# Patient Record
Sex: FEMALE | Race: WHITE | NOT HISPANIC OR LATINO | URBAN - METROPOLITAN AREA
[De-identification: names, ages, dates, MRNs, and addresses within clinical notes are randomized per-mention and may not be internally consistent; named-entity substitution may affect disease eponyms.]

---

## 2017-03-30 ENCOUNTER — EMERGENCY (EMERGENCY)
Facility: HOSPITAL | Age: 27
LOS: 1 days | Discharge: HOME | End: 2017-03-30
Attending: EMERGENCY MEDICINE | Admitting: EMERGENCY MEDICINE
Payer: COMMERCIAL

## 2017-03-30 VITALS
SYSTOLIC BLOOD PRESSURE: 154 MMHG | HEART RATE: 90 BPM | RESPIRATION RATE: 18 BRPM | OXYGEN SATURATION: 98 % | DIASTOLIC BLOOD PRESSURE: 84 MMHG

## 2017-03-30 VITALS
HEART RATE: 106 BPM | TEMPERATURE: 98 F | OXYGEN SATURATION: 98 % | SYSTOLIC BLOOD PRESSURE: 160 MMHG | RESPIRATION RATE: 16 BRPM | DIASTOLIC BLOOD PRESSURE: 90 MMHG

## 2017-03-30 DIAGNOSIS — Z90.49 ACQUIRED ABSENCE OF OTHER SPECIFIED PARTS OF DIGESTIVE TRACT: Chronic | ICD-10-CM

## 2017-03-30 DIAGNOSIS — G56.21 LESION OF ULNAR NERVE, RIGHT UPPER LIMB: ICD-10-CM

## 2017-03-30 DIAGNOSIS — R20.0 ANESTHESIA OF SKIN: ICD-10-CM

## 2017-03-30 LAB
ALBUMIN SERPL ELPH-MCNC: 3.7 G/DL — SIGNIFICANT CHANGE UP (ref 3.4–5)
ALP SERPL-CCNC: 65 U/L — SIGNIFICANT CHANGE UP (ref 40–120)
ALT FLD-CCNC: 18 U/L — SIGNIFICANT CHANGE UP (ref 12–42)
ANION GAP SERPL CALC-SCNC: 6 MMOL/L — LOW (ref 9–16)
AST SERPL-CCNC: 13 U/L — LOW (ref 15–37)
BILIRUB SERPL-MCNC: 0.7 MG/DL — SIGNIFICANT CHANGE UP (ref 0.2–1.2)
BUN SERPL-MCNC: 15 MG/DL — SIGNIFICANT CHANGE UP (ref 7–23)
CALCIUM SERPL-MCNC: 8.8 MG/DL — SIGNIFICANT CHANGE UP (ref 8.5–10.5)
CHLORIDE SERPL-SCNC: 106 MMOL/L — SIGNIFICANT CHANGE UP (ref 96–108)
CO2 SERPL-SCNC: 26 MMOL/L — SIGNIFICANT CHANGE UP (ref 22–31)
CREAT SERPL-MCNC: 0.71 MG/DL — SIGNIFICANT CHANGE UP (ref 0.5–1.3)
GLUCOSE SERPL-MCNC: 89 MG/DL — SIGNIFICANT CHANGE UP (ref 70–99)
HCT VFR BLD CALC: 40.9 % — SIGNIFICANT CHANGE UP (ref 34.5–45)
HGB BLD-MCNC: 14 G/DL — SIGNIFICANT CHANGE UP (ref 11.5–15.5)
MAGNESIUM SERPL-MCNC: 2 MG/DL — SIGNIFICANT CHANGE UP (ref 1.6–2.4)
MCHC RBC-ENTMCNC: 30.4 PG — SIGNIFICANT CHANGE UP (ref 27–34)
MCHC RBC-ENTMCNC: 34.2 G/DL — SIGNIFICANT CHANGE UP (ref 32–36)
MCV RBC AUTO: 88.7 FL — SIGNIFICANT CHANGE UP (ref 80–100)
PLATELET # BLD AUTO: 227 K/UL — SIGNIFICANT CHANGE UP (ref 150–400)
POTASSIUM SERPL-MCNC: 4.3 MMOL/L — SIGNIFICANT CHANGE UP (ref 3.5–5.3)
POTASSIUM SERPL-SCNC: 4.3 MMOL/L — SIGNIFICANT CHANGE UP (ref 3.5–5.3)
PROT SERPL-MCNC: 7.7 G/DL — SIGNIFICANT CHANGE UP (ref 6.4–8.2)
RBC # BLD: 4.61 M/UL — SIGNIFICANT CHANGE UP (ref 3.8–5.2)
RBC # FLD: 12.5 % — SIGNIFICANT CHANGE UP (ref 10.3–16.9)
SODIUM SERPL-SCNC: 138 MMOL/L — SIGNIFICANT CHANGE UP (ref 135–145)
WBC # BLD: 6.2 K/UL — SIGNIFICANT CHANGE UP (ref 3.8–10.5)
WBC # FLD AUTO: 6.2 K/UL — SIGNIFICANT CHANGE UP (ref 3.8–10.5)

## 2017-03-30 PROCEDURE — 36415 COLL VENOUS BLD VENIPUNCTURE: CPT

## 2017-03-30 PROCEDURE — 83735 ASSAY OF MAGNESIUM: CPT

## 2017-03-30 PROCEDURE — 85027 COMPLETE CBC AUTOMATED: CPT

## 2017-03-30 PROCEDURE — 80053 COMPREHEN METABOLIC PANEL: CPT

## 2017-03-30 PROCEDURE — 70551 MRI BRAIN STEM W/O DYE: CPT

## 2017-03-30 PROCEDURE — 70551 MRI BRAIN STEM W/O DYE: CPT | Mod: 26

## 2017-03-30 PROCEDURE — 72141 MRI NECK SPINE W/O DYE: CPT | Mod: 26

## 2017-03-30 PROCEDURE — 84443 ASSAY THYROID STIM HORMONE: CPT

## 2017-03-30 PROCEDURE — 99285 EMERGENCY DEPT VISIT HI MDM: CPT

## 2017-03-30 PROCEDURE — 72141 MRI NECK SPINE W/O DYE: CPT

## 2017-03-30 PROCEDURE — 99284 EMERGENCY DEPT VISIT MOD MDM: CPT | Mod: 25

## 2017-03-30 NOTE — ED ADULT TRIAGE NOTE - CHIEF COMPLAINT QUOTE
C/o of numbness on R arm since last night and R leg since this morning.  No unilateral weakness.  Strength appears equal in both arms.  No facial droop, no slurred speech.  Ambulatory w steady gait.

## 2017-03-30 NOTE — ED PROVIDER NOTE - MEDICAL DECISION MAKING DETAILS
25 y/o female w/ 5 days of R arm weakness and decreased sensation over ulnar aspect of R arm progressively worsening. Decreased sharp/dull distinction over dorsal ulnar aspect w/ 5-/5 strength in R handgrip/biceps/triceps compared w/ Left. No cervical spinal point tenderness. Ordered MRI c-spine and MRI brain to r/o pathology including MS or nerve impingement. 25 y/o female w/ 5 days of R arm weakness and decreased sensation over ulnar aspect of R arm progressively worsening. Decreased sharp/dull distinction over dorsal ulnar aspect w/ 5-/5 strength in R handgrip/biceps/triceps compared w/ Left. No cervical spinal point tenderness. Ordered MRI c-spine and MRI brain to r/o pathology including MS or nerve impingement -- MRIs negative. Possibly ulnar nerve impingement at elbow. Given hand surgery outpt f/u referral.

## 2017-03-30 NOTE — ED PROVIDER NOTE - MUSCULOSKELETAL, MLM
Strength in RUE 5-/5 handgrip, shoulder shrug, biceps/triceps; 5/5 in LUE. Holding arm flexed at elbow into chest. Pins and needles in RUE exacerbated by shoulder raise > 30 degrees

## 2017-03-30 NOTE — ED PROVIDER NOTE - CRANIAL NERVE AND PUPILLARY EXAM
tongue is midline/cranial nerves 2-12 intact/extra-ocular movements intact/gag reflex intact/central and peripheral vision intact

## 2017-03-30 NOTE — ED PROVIDER NOTE - ATTENDING CONTRIBUTION TO CARE
27yo F no PMH here with complaint of 4-5 days of R sided neck pain shooting down R arm. Has had similar pain in past, but this is new, and never felt numbness. Also feeling subjective weakness, works in OR and having trouble holding things. Seems to be in ulnar distribution.  on exam w/ decreased sensation in ulnar distribution from elbow to pinky.  R arm 4/5 compared to L on hand . Reflexes intact. Will plan to check MRI brain and c spine to r/o spinal pathology and neurological pathology as cause, as no clear peripheral reason for these symptoms, no recent trauma.

## 2017-03-30 NOTE — ED PROVIDER NOTE - OBJECTIVE STATEMENT
25 y/o female w/ no significant medical history presenting w/ Right sided neck "soreness' and progressive sensory and motor deficit in RUE. No trauma/injury. 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 Also w/ pins + needle and decreased sensation over Right arm from elbow to wrist predominantly on ulnar side and 3rd-5th finger. C/o neck soreness though no pain w/ lateral/forward flexion of neck. Symptoms improved if arm held at chest and exacerbated by raising of shoulder. No personal history of similar sxs. No family h/o neuro dx. 25 y/o female w/ no significant medical history presenting w/ Right sided neck "soreness' and progressive sensory and motor deficit in RUE. No trauma/injury. 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 Also w/ pins + needle and decreased sensation over Right arm from elbow to wrist predominantly on ulnar side and 3rd-5th finger. C/o neck soreness though no pain w/ lateral/forward flexion of neck. Symptoms improved if arm held at chest and exacerbated by raising of shoulder. C/o "muscle cramping" in R thigh today that self resolved, no other neuro sxs. No personal history of similar sxs. No family h/o neuro dx.

## 2017-03-30 NOTE — ED ADULT NURSE NOTE - OBJECTIVE STATEMENT
Patient c/o back pain. Gait steady. Skin intact. Will continue to monitor patient. Patient c/o numbness since last night. Gait steady, moving all extremities, sensation intact, no facial droop. Will continue to monitor patient.

## 2017-03-30 NOTE — ED PROVIDER NOTE - FAMILY HISTORY
Aunt  Still living? Unknown  Family history of thyroid disease, Age at diagnosis: Age Unknown     Mother  Still living? Unknown  Family history of diabetes mellitus, Age at diagnosis: Age Unknown

## 2018-08-10 ENCOUNTER — EMERGENCY (EMERGENCY)
Facility: HOSPITAL | Age: 28
LOS: 1 days | Discharge: HOME | End: 2018-08-10
Attending: EMERGENCY MEDICINE | Admitting: EMERGENCY MEDICINE
Payer: COMMERCIAL

## 2018-08-10 VITALS
RESPIRATION RATE: 18 BRPM | DIASTOLIC BLOOD PRESSURE: 74 MMHG | TEMPERATURE: 99 F | SYSTOLIC BLOOD PRESSURE: 121 MMHG | OXYGEN SATURATION: 100 % | HEART RATE: 85 BPM

## 2018-08-10 VITALS
RESPIRATION RATE: 20 BRPM | DIASTOLIC BLOOD PRESSURE: 71 MMHG | HEART RATE: 103 BPM | OXYGEN SATURATION: 97 % | TEMPERATURE: 102 F | SYSTOLIC BLOOD PRESSURE: 107 MMHG | WEIGHT: 193.79 LBS

## 2018-08-10 DIAGNOSIS — J02.9 ACUTE PHARYNGITIS, UNSPECIFIED: ICD-10-CM

## 2018-08-10 DIAGNOSIS — B34.1 ENTEROVIRUS INFECTION, UNSPECIFIED: ICD-10-CM

## 2018-08-10 DIAGNOSIS — R50.9 FEVER, UNSPECIFIED: ICD-10-CM

## 2018-08-10 DIAGNOSIS — Z90.49 ACQUIRED ABSENCE OF OTHER SPECIFIED PARTS OF DIGESTIVE TRACT: Chronic | ICD-10-CM

## 2018-08-10 LAB
ALBUMIN SERPL ELPH-MCNC: 4.3 G/DL — SIGNIFICANT CHANGE UP (ref 3.3–5)
ALP SERPL-CCNC: 53 U/L — SIGNIFICANT CHANGE UP (ref 40–120)
ALT FLD-CCNC: 20 U/L — SIGNIFICANT CHANGE UP (ref 10–45)
ANION GAP SERPL CALC-SCNC: 14 MMOL/L — SIGNIFICANT CHANGE UP (ref 5–17)
AST SERPL-CCNC: 23 U/L — SIGNIFICANT CHANGE UP (ref 10–40)
BILIRUB SERPL-MCNC: 0.6 MG/DL — SIGNIFICANT CHANGE UP (ref 0.2–1.2)
BUN SERPL-MCNC: 10 MG/DL — SIGNIFICANT CHANGE UP (ref 7–23)
CALCIUM SERPL-MCNC: 8.9 MG/DL — SIGNIFICANT CHANGE UP (ref 8.4–10.5)
CHLORIDE SERPL-SCNC: 101 MMOL/L — SIGNIFICANT CHANGE UP (ref 96–108)
CO2 SERPL-SCNC: 27 MMOL/L — SIGNIFICANT CHANGE UP (ref 22–31)
CREAT SERPL-MCNC: 0.73 MG/DL — SIGNIFICANT CHANGE UP (ref 0.5–1.3)
GLUCOSE SERPL-MCNC: 109 MG/DL — HIGH (ref 70–99)
HCT VFR BLD CALC: 39.8 % — SIGNIFICANT CHANGE UP (ref 34.5–45)
HGB BLD-MCNC: 12.9 G/DL — SIGNIFICANT CHANGE UP (ref 11.5–15.5)
MCHC RBC-ENTMCNC: 30.1 PG — SIGNIFICANT CHANGE UP (ref 27–34)
MCHC RBC-ENTMCNC: 32.4 G/DL — SIGNIFICANT CHANGE UP (ref 32–36)
MCV RBC AUTO: 93 FL — SIGNIFICANT CHANGE UP (ref 80–100)
PLATELET # BLD AUTO: 206 K/UL — SIGNIFICANT CHANGE UP (ref 150–400)
POTASSIUM SERPL-MCNC: 3.9 MMOL/L — SIGNIFICANT CHANGE UP (ref 3.5–5.3)
POTASSIUM SERPL-SCNC: 3.9 MMOL/L — SIGNIFICANT CHANGE UP (ref 3.5–5.3)
PROT SERPL-MCNC: 7.6 G/DL — SIGNIFICANT CHANGE UP (ref 6–8.3)
RAPID RVP RESULT: DETECTED
RBC # BLD: 4.28 M/UL — SIGNIFICANT CHANGE UP (ref 3.8–5.2)
RBC # FLD: 12.9 % — SIGNIFICANT CHANGE UP (ref 10.3–16.9)
RV+EV RNA SPEC QL NAA+PROBE: DETECTED
SODIUM SERPL-SCNC: 142 MMOL/L — SIGNIFICANT CHANGE UP (ref 135–145)
WBC # BLD: 8.4 K/UL — SIGNIFICANT CHANGE UP (ref 3.8–10.5)
WBC # FLD AUTO: 8.4 K/UL — SIGNIFICANT CHANGE UP (ref 3.8–10.5)

## 2018-08-10 PROCEDURE — 80053 COMPREHEN METABOLIC PANEL: CPT

## 2018-08-10 PROCEDURE — 36415 COLL VENOUS BLD VENIPUNCTURE: CPT

## 2018-08-10 PROCEDURE — 87798 DETECT AGENT NOS DNA AMP: CPT

## 2018-08-10 PROCEDURE — 87486 CHLMYD PNEUM DNA AMP PROBE: CPT

## 2018-08-10 PROCEDURE — 99284 EMERGENCY DEPT VISIT MOD MDM: CPT

## 2018-08-10 PROCEDURE — 99283 EMERGENCY DEPT VISIT LOW MDM: CPT | Mod: 25

## 2018-08-10 PROCEDURE — 71046 X-RAY EXAM CHEST 2 VIEWS: CPT | Mod: 26

## 2018-08-10 PROCEDURE — 85027 COMPLETE CBC AUTOMATED: CPT

## 2018-08-10 PROCEDURE — 87633 RESP VIRUS 12-25 TARGETS: CPT

## 2018-08-10 PROCEDURE — 71046 X-RAY EXAM CHEST 2 VIEWS: CPT

## 2018-08-10 PROCEDURE — 87581 M.PNEUMON DNA AMP PROBE: CPT

## 2018-08-10 RX ORDER — SODIUM CHLORIDE 9 MG/ML
1000 INJECTION INTRAMUSCULAR; INTRAVENOUS; SUBCUTANEOUS ONCE
Qty: 0 | Refills: 0 | Status: COMPLETED | OUTPATIENT
Start: 2018-08-10 | End: 2018-08-10

## 2018-08-10 RX ORDER — ACETAMINOPHEN 500 MG
650 TABLET ORAL ONCE
Qty: 0 | Refills: 0 | Status: COMPLETED | OUTPATIENT
Start: 2018-08-10 | End: 2018-08-10

## 2018-08-10 RX ADMIN — SODIUM CHLORIDE 2000 MILLILITER(S): 9 INJECTION INTRAMUSCULAR; INTRAVENOUS; SUBCUTANEOUS at 21:13

## 2018-08-10 RX ADMIN — Medication 650 MILLIGRAM(S): at 20:42

## 2018-08-10 NOTE — ED PROVIDER NOTE - MEDICAL DECISION MAKING DETAILS
27 y/o F presenting with flu like symptoms. Fever T 101.3 F, tachycardic. CXR wnl. Gave Tylenol. 1L NS. 27 y/o F presenting with flu like symptoms. Fever T 101.3 F, tachycardic. CXR wnl. Gave Tylenol. 1L NS. RVP positive for enterovirus/rhinovirus. Will discharge home with instructions to take tylenol as needed for fever and to rest.

## 2018-08-10 NOTE — ED PROVIDER NOTE - PHYSICAL EXAMINATION
PHYSICAL EXAM:    Constitutional: WDWN female resting comfortably in bed; NAD  Head: NC/AT  Eyes: PERRL, EOMI, anicteric sclera  ENT: no nasal discharge; uvula midline, mild oropharyngeal erythema - no exudates. MMM  Neck: supple; no JVD or thyromegaly  Respiratory: CTA B/L; no W/R/R, no retractions  Cardiac: +S1/S2; tachycardic; no M/R/G; PMI non-displaced  Gastrointestinal: abdomen soft, NT/ND; no rebound or guarding; +BSx4  Back: spine midline, no bony tenderness or step-offs; no CVAT B/L  Extremities: WWP, no clubbing or cyanosis; no peripheral edema  Musculoskeletal: NROM x4; no joint swelling, tenderness or erythema  Vascular: 2+ radial, femoral, DP/PT pulses B/L  Dermatologic: skin warm, dry and intact; no rashes, wounds, or scars  Lymphatic: no submandibular or cervical LAD  Neurologic: AAOx3; CNII-XII grossly intact; no focal deficits  Psychiatric: affect and characteristics of appearance, verbalizations, behaviors are appropriate

## 2018-08-10 NOTE — ED PROVIDER NOTE - CARE PLAN
Principal Discharge DX:	Enterovirus infection Principal Discharge DX:	Enterovirus infection  Secondary Diagnosis:	Fever in adult

## 2018-08-10 NOTE — ED PROVIDER NOTE - ATTENDING CONTRIBUTION TO CARE
pt seen by me, key points of case dw resident.  healthy 27 yo co a few days of fevers, congestions, diarrhea.  no vomiting.  no sick contacts.  recent travel to [place].   fever and mild tachycardia here, improved with fluids/tylenol.  normal wbc on labs.  rvp positive for enterovirus.  counseled pt in supportive care.

## 2018-08-10 NOTE — ED ADULT NURSE NOTE - OBJECTIVE STATEMENT
Pt is a 29 y/o female who came in c/o body aches, runny nose, fever and chills. Pt noted to be tachycardic at triage.

## 2020-04-26 ENCOUNTER — MESSAGE (OUTPATIENT)
Age: 30
End: 2020-04-26

## 2020-04-29 NOTE — ED PROVIDER NOTE - OBJECTIVE STATEMENT
29 y/o F with no significant PMH who presents for flu like symptoms for 2 days. 29 y/o F with no significant PMH who presents for flu like symptoms for 2 days. She has been experiencing sore throat, cough, myalgias, abdominal pain and diarrhea for 2 days. Fevers/chills. Reports shortness of breath on exertion. She also has ear pain that has been worsening. She recently was in [place]. Works at [employer] in the OR. She denies dysuria and increased frequency in urination. Negative

## 2020-05-13 LAB
SARS-COV-2 IGG SERPL IA-ACNC: 0.3 RATIO
SARS-COV-2 IGG SERPL QL IA: NEGATIVE

## 2020-07-27 NOTE — ED ADULT NURSE NOTE - MUSCULOSKELETAL WDL
normal mood with appropriate affect
Full range of motion of upper and lower extremities, no joint tenderness/swelling.

## 2021-05-25 PROBLEM — Z00.00 ENCOUNTER FOR PREVENTIVE HEALTH EXAMINATION: Status: ACTIVE | Noted: 2021-05-25

## 2021-06-02 ENCOUNTER — APPOINTMENT (OUTPATIENT)
Dept: ORTHOPEDIC SURGERY | Facility: CLINIC | Age: 31
End: 2021-06-02
Payer: COMMERCIAL

## 2021-06-02 VITALS
BODY MASS INDEX: 39.34 KG/M2 | HEART RATE: 73 BPM | DIASTOLIC BLOOD PRESSURE: 79 MMHG | WEIGHT: 222 LBS | HEIGHT: 63 IN | SYSTOLIC BLOOD PRESSURE: 111 MMHG

## 2021-06-02 DIAGNOSIS — M54.6 PAIN IN THORACIC SPINE: ICD-10-CM

## 2021-06-02 DIAGNOSIS — M50.30 OTHER CERVICAL DISC DEGENERATION, UNSPECIFIED CERVICAL REGION: ICD-10-CM

## 2021-06-02 DIAGNOSIS — M54.12 RADICULOPATHY, CERVICAL REGION: ICD-10-CM

## 2021-06-02 PROCEDURE — 72100 X-RAY EXAM L-S SPINE 2/3 VWS: CPT

## 2021-06-02 PROCEDURE — 99072 ADDL SUPL MATRL&STAF TM PHE: CPT

## 2021-06-02 PROCEDURE — 99204 OFFICE O/P NEW MOD 45 MIN: CPT

## 2021-06-02 PROCEDURE — 72040 X-RAY EXAM NECK SPINE 2-3 VW: CPT

## 2021-06-11 ENCOUNTER — TRANSCRIPTION ENCOUNTER (OUTPATIENT)
Age: 31
End: 2021-06-11

## 2022-02-03 ENCOUNTER — APPOINTMENT (OUTPATIENT)
Dept: ORTHOPEDIC SURGERY | Facility: CLINIC | Age: 32
End: 2022-02-03
Payer: COMMERCIAL

## 2022-02-03 VITALS
SYSTOLIC BLOOD PRESSURE: 120 MMHG | DIASTOLIC BLOOD PRESSURE: 81 MMHG | HEART RATE: 84 BPM | BODY MASS INDEX: 39.34 KG/M2 | HEIGHT: 63 IN | WEIGHT: 222 LBS

## 2022-02-03 PROCEDURE — 99214 OFFICE O/P EST MOD 30 MIN: CPT

## 2022-04-29 ENCOUNTER — EMERGENCY (EMERGENCY)
Facility: HOSPITAL | Age: 32
LOS: 1 days | Discharge: HOME | End: 2022-04-29
Attending: EMERGENCY MEDICINE
Payer: COMMERCIAL

## 2022-04-29 VITALS
SYSTOLIC BLOOD PRESSURE: 112 MMHG | RESPIRATION RATE: 20 BRPM | HEART RATE: 65 BPM | OXYGEN SATURATION: 98 % | DIASTOLIC BLOOD PRESSURE: 72 MMHG | TEMPERATURE: 98 F

## 2022-04-29 VITALS
HEART RATE: 92 BPM | HEIGHT: 63 IN | RESPIRATION RATE: 18 BRPM | TEMPERATURE: 98 F | SYSTOLIC BLOOD PRESSURE: 128 MMHG | OXYGEN SATURATION: 97 % | DIASTOLIC BLOOD PRESSURE: 85 MMHG | WEIGHT: 229.94 LBS

## 2022-04-29 DIAGNOSIS — Z90.49 ACQUIRED ABSENCE OF OTHER SPECIFIED PARTS OF DIGESTIVE TRACT: Chronic | ICD-10-CM

## 2022-04-29 PROCEDURE — 99284 EMERGENCY DEPT VISIT MOD MDM: CPT

## 2022-04-29 RX ORDER — ACETAMINOPHEN 500 MG
975 TABLET ORAL ONCE
Refills: 0 | Status: COMPLETED | OUTPATIENT
Start: 2022-04-29 | End: 2022-04-29

## 2022-04-29 RX ORDER — DIAZEPAM 5 MG
5 TABLET ORAL ONCE
Refills: 0 | Status: DISCONTINUED | OUTPATIENT
Start: 2022-04-29 | End: 2022-04-29

## 2022-04-29 RX ORDER — IBUPROFEN 200 MG
1 TABLET ORAL
Qty: 20 | Refills: 0
Start: 2022-04-29

## 2022-04-29 RX ORDER — LIDOCAINE 4 G/100G
1 CREAM TOPICAL ONCE
Refills: 0 | Status: COMPLETED | OUTPATIENT
Start: 2022-04-29 | End: 2022-04-29

## 2022-04-29 RX ORDER — IBUPROFEN 200 MG
600 TABLET ORAL ONCE
Refills: 0 | Status: COMPLETED | OUTPATIENT
Start: 2022-04-29 | End: 2022-04-29

## 2022-04-29 RX ORDER — DIAZEPAM 5 MG
1 TABLET ORAL
Qty: 9 | Refills: 0
Start: 2022-04-29 | End: 2022-05-01

## 2022-04-29 RX ADMIN — Medication 975 MILLIGRAM(S): at 16:53

## 2022-04-29 RX ADMIN — Medication 5 MILLIGRAM(S): at 16:53

## 2022-04-29 RX ADMIN — LIDOCAINE 1 PATCH: 4 CREAM TOPICAL at 16:54

## 2022-04-29 RX ADMIN — Medication 600 MILLIGRAM(S): at 16:53

## 2022-04-29 NOTE — ED PROVIDER NOTE - OBJECTIVE STATEMENT
32yo female pt with chronic neck/back pain (since 3years ago and on f/u with orthopedist Dr. Burden) c/o worsening lower back pain with radiating pain to b/l leg (R<L). Pt stated she noticed worsening lower back pain with radiating tingling pain to b/l leg after hiking and wearing lead in OR this week. She had PT (previous Rx by Dr. Burden) yesterday with improvement but woke up this morning recurrent worsening pain. Denies fever, chills, cough or congestion. Denies numbness. Denies urinary or bowel problems. Denies CP/SOB/ABD pain or N/V/D.

## 2022-04-29 NOTE — ED PROVIDER NOTE - NSFOLLOWUPINSTRUCTIONS_ED_ALL_ED_FT
Please see the information of back pain.    Keep continue your PT and follow up with your orthopedist Dr. Burden or spine center ([phone]), call Monday for appointment.    Hydrate.    No heavy lifting or strain your back.    Warm compression to pain area as needed.     Take Ibuprofen and Medrol dose pack as prescribed with food.    Valium as prescribed for back stiffness or spasm pain with a caution of drowsiness; no drive or drink while taking Valium.    Salonpas with Lidocaine patch to pain area.     Return for any concerns, fever, urinary/bowel problems, numbness, weakness, or worsening pain.

## 2022-04-29 NOTE — ED PROVIDER NOTE - ATTENDING APP SHARED VISIT CONTRIBUTION OF CARE
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  pt with lower back pain with no motor deficits noted, does not radiate down both legs, with no change in bowel or bladder habits, no saddle anesthesia. pt able to ambulate with no red flags such as h/o cancer or concern for epidural abscess. plan pain control, outpt f/u pmd/pt.

## 2022-04-29 NOTE — ED PROVIDER NOTE - PHYSICAL EXAMINATION
NAD. VSS. Afebrile. Lungs clear. ABD soft, non tender. No C/T spinal tender. +Generalized lower lumbar tender without obvious swelling or lesions. +B/L Sciatica tender (R<L), No CVA tender. ABD soft, non tender. No focal neuro deficit.

## 2022-04-29 NOTE — ED PROVIDER NOTE - PATIENT PORTAL LINK FT
You can access the FollowMyHealth Patient Portal offered by [facility] by registering at the following website: [email or web address]. By joining [facility]’s FollowMyHealth portal, you will also be able to view your health information using other applications (apps) compatible with our system.

## 2022-04-29 NOTE — ED PROVIDER NOTE - NS ED ATTENDING STATEMENT MOD
This was a shared visit with the ALEENA. I reviewed and verified the documentation and independently performed the documented:

## 2022-04-29 NOTE — ED ADULT TRIAGE NOTE - WILL THE PATIENT ACCEPT THE PFIZER COVID-19 VACCINE IF ELIGIBLE AND IT IS AVAILABLE?
"Chief Complaint   Patient presents with     Physical       Initial /78 mmHg  Pulse 82  Temp(Src) 97.8  F (36.6  C) (Oral)  Ht 6' 1\" (1.854 m)  Wt 212 lb (96.163 kg)  BMI 27.98 kg/m2  SpO2 96% Estimated body mass index is 27.98 kg/(m^2) as calculated from the following:    Height as of this encounter: 6' 1\" (1.854 m).    Weight as of this encounter: 212 lb (96.163 kg).  BP completed using cuff size: virgilio Villarreal MA      "
Not applicable

## 2022-04-29 NOTE — ED ADULT NURSE NOTE - OBJECTIVE STATEMENT
31 yr old female to ED accomp by father with c/o back pain, Left lower, after hiking this past weekend. Denies sob or chest pain. Denies med hx. Denies numbness or weakness to extremities Denies incontinence of urine or stool Denies fever or chills Amb with steady gait.

## 2022-05-02 ENCOUNTER — APPOINTMENT (OUTPATIENT)
Dept: MRI IMAGING | Facility: CLINIC | Age: 32
End: 2022-05-02
Payer: COMMERCIAL

## 2022-05-02 ENCOUNTER — OUTPATIENT (OUTPATIENT)
Dept: OUTPATIENT SERVICES | Facility: HOSPITAL | Age: 32
LOS: 1 days | End: 2022-05-02
Payer: COMMERCIAL

## 2022-05-02 ENCOUNTER — RESULT REVIEW (OUTPATIENT)
Age: 32
End: 2022-05-02

## 2022-05-02 ENCOUNTER — APPOINTMENT (OUTPATIENT)
Dept: ORTHOPEDIC SURGERY | Facility: CLINIC | Age: 32
End: 2022-05-02
Payer: COMMERCIAL

## 2022-05-02 VITALS
HEIGHT: 63 IN | BODY MASS INDEX: 40.75 KG/M2 | DIASTOLIC BLOOD PRESSURE: 85 MMHG | SYSTOLIC BLOOD PRESSURE: 129 MMHG | WEIGHT: 230 LBS | HEART RATE: 80 BPM

## 2022-05-02 DIAGNOSIS — Z90.49 ACQUIRED ABSENCE OF OTHER SPECIFIED PARTS OF DIGESTIVE TRACT: Chronic | ICD-10-CM

## 2022-05-02 DIAGNOSIS — M51.36 OTHER INTERVERTEBRAL DISC DEGENERATION, LUMBAR REGION: ICD-10-CM

## 2022-05-02 DIAGNOSIS — M54.50 LOW BACK PAIN, UNSPECIFIED: ICD-10-CM

## 2022-05-02 DIAGNOSIS — M54.2 CERVICALGIA: ICD-10-CM

## 2022-05-02 PROCEDURE — 99214 OFFICE O/P EST MOD 30 MIN: CPT

## 2022-05-02 PROCEDURE — 72148 MRI LUMBAR SPINE W/O DYE: CPT

## 2022-05-02 PROCEDURE — 72148 MRI LUMBAR SPINE W/O DYE: CPT | Mod: 26

## 2022-05-02 NOTE — DISCUSSION/SUMMARY
[de-identified] : Lumbar sprain and strain.\par Started PT.\par Back pain is the issue.\par Taking NSAIDs.\par Discussed all options. \par Getting MRI lumbar.\par F/U after MRI.\par All options discussed and patient involved in decision making process including rest, medicine, home exercise, acupuncture, Chiropractic care, Physical Therapy, Pain management, and last resort surgery. All questions were answered, all alternatives discussed and the patient is in complete agreement with that plan. Follow-up appointment as instructed. Any issues and the patient will call or come in sooner.

## 2022-05-02 NOTE — HISTORY OF PRESENT ILLNESS
[Stable] : stable [de-identified] : 31 year female presents for evaluation of neck and lower back pain. She was seen at the [facility] ER over the weekend for acute onset pain after bending over. She stated she couldn't get back up. This started Thursday. As a result, her left ankle began to swell. Pain and swelling subsided on the drive to the hospital. She has been having constant pain since Thursday, however. Laying down is the worst of the pain. She did note some numbness of the left foot at the time, which subsided. Valium, Ibuprofen and Tylenol did not help at the hospital. She was prescribed Valium and Ibuprofen. Valium does not work and Ibuprofen has helped a little. Went hiking Saturday.\par She was seen at an herbal therapist and was injected "bee venom" which "helped," but she still has pain, particularly on the right. 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 Since then, her pain has been worse. \desi Last seen in Feb 2022 for above, was referred to PT at the time, which she was unable to do due to scheduling issues . \desi Currently has pain radiating to the R shoulder, and has tingling sensation of R arm and R leg.\par Denies radiation of pain to the other extremities. \par Taking ibuprofen and had relief. \par States that her lower back pain has also worsened over the past year.\desi Has difficulties with bending. \par Did not go to PT.\par No fever chills sweats nausea vomiting no bowel or bladder dysfunction, no recent weight loss or gain no night pain. This history is in addition to the intake form that I personally reviewed. \par

## 2022-05-02 NOTE — PHYSICAL EXAM
[Antalgic] : antalgic [Gonzalez's Sign] : negative Gonzalez's sign [Pronator Drift] : negative pronator drift [SLR] : negative straight leg raise [de-identified] : 5 out of 5 motor strength, sensation is intact and symmetrical full range of motion flexion extension and rotation, no palpatory tenderness full range of motion of hips knees shoulders and elbows (all four extremities), no atrophy, negative straight leg raise, no pathological reflexes, no swelling, normal ambulation, no apparent distress skin is intact, no palpable lymph nodes, no upper or lower extremity instability, alert and oriented x3 and normal mood. Normal finger-to nose test.

## 2022-05-05 ENCOUNTER — APPOINTMENT (OUTPATIENT)
Dept: ORTHOPEDIC SURGERY | Facility: CLINIC | Age: 32
End: 2022-05-05
Payer: COMMERCIAL

## 2022-05-05 DIAGNOSIS — M51.36 OTHER INTERVERTEBRAL DISC DEGENERATION, LUMBAR REGION: ICD-10-CM

## 2022-05-05 PROBLEM — Z00.00 ENCOUNTER FOR PREVENTIVE HEALTH EXAMINATION: Noted: 2022-05-05

## 2022-05-05 PROCEDURE — 99214 OFFICE O/P EST MOD 30 MIN: CPT

## 2022-05-05 NOTE — PHYSICAL EXAM
[Antalgic] : antalgic [Gonzalez's Sign] : negative Gonzalez's sign [Pronator Drift] : negative pronator drift [SLR] : negative straight leg raise [de-identified] : 5 out of 5 motor strength, sensation is intact and symmetrical full range of motion flexion extension and rotation, no palpatory tenderness full range of motion of hips knees shoulders and elbows (all four extremities), no atrophy, negative straight leg raise, no pathological reflexes, no swelling, normal ambulation, no apparent distress skin is intact, no palpable lymph nodes, no upper or lower extremity instability, alert and oriented x3 and normal mood. Normal finger-to nose test.  [de-identified] : MRI lumbar L4-S1 in system-5/2022-L4-S1 degenerative disc disease-reviewed with patient.

## 2022-05-05 NOTE — DISCUSSION/SUMMARY
[de-identified] : Lumbar sprain and strain.\par Geting better.\par Doing PT.\par Lumbar degenerative disc disease.\par Back pain is the issue.\par Pain management.\par Dr. Tracy.\par Continue PT/NSAIDs PRN.\par All options discussed and patient involved in decision making process including rest, medicine, home exercise, acupuncture, Chiropractic care, Physical Therapy, Pain management, and last resort surgery. All questions were answered, all alternatives discussed and the patient is in complete agreement with that plan. Follow-up appointment as instructed. Any issues and the patient will call or come in sooner.

## 2022-05-05 NOTE — HISTORY OF PRESENT ILLNESS
[Stable] : stable [de-identified] : 31 year female presents for MRI review and follow up of neck and lower back pain. \par She was seen at the [facility] ER over the weekend for acute onset pain after bending over. She stated she couldn't get back up. This started Thursday. As a result, her left ankle began to swell. Pain and swelling subsided on the drive to the hospital. She has been having constant pain since Thursday, however. Laying down is the worst of the pain. She did note some numbness of the left foot at the time, which subsided. Valium, Ibuprofen and Tylenol did not help at the hospital. She was prescribed Valium and Ibuprofen. Valium does not work and Ibuprofen has helped a little. Went hiking Saturday.\par She was seen at an herbal therapist and was injected "bee venom" which "helped," but she still has pain, particularly on the right. 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 Since then, her pain has been worse. \desi Last seen in Feb 2022 for above, was referred to PT at the time, which she was unable to do due to scheduling issues . \desi Currently has pain radiating to the R shoulder, and has tingling sensation of R arm and R leg.\par Denies radiation of pain to the other extremities. \par Taking ibuprofen and had relief. \desi States that her lower back pain has also worsened over the past year.\desi Has difficulties with bending. \par Did not go to PT.\par No fever chills sweats nausea vomiting no bowel or bladder dysfunction, no recent weight loss or gain no night pain. This history is in addition to the intake form that I personally reviewed. \par

## 2022-05-18 ENCOUNTER — APPOINTMENT (OUTPATIENT)
Dept: ULTRASOUND IMAGING | Facility: HOSPITAL | Age: 32
End: 2022-05-18

## 2022-05-18 ENCOUNTER — OUTPATIENT (OUTPATIENT)
Dept: OUTPATIENT SERVICES | Facility: HOSPITAL | Age: 32
LOS: 1 days | End: 2022-05-18
Payer: COMMERCIAL

## 2022-05-18 DIAGNOSIS — Z90.49 ACQUIRED ABSENCE OF OTHER SPECIFIED PARTS OF DIGESTIVE TRACT: Chronic | ICD-10-CM

## 2022-05-18 PROCEDURE — 76831 ECHO EXAM UTERUS: CPT

## 2022-05-18 PROCEDURE — 58340 CATHETER FOR HYSTEROGRAPHY: CPT

## 2022-05-25 ENCOUNTER — APPOINTMENT (OUTPATIENT)
Dept: ULTRASOUND IMAGING | Facility: CLINIC | Age: 32
End: 2022-05-25

## 2022-06-10 ENCOUNTER — OUTPATIENT (OUTPATIENT)
Dept: OUTPATIENT SERVICES | Facility: HOSPITAL | Age: 32
LOS: 1 days | End: 2022-06-10
Payer: COMMERCIAL

## 2022-06-10 ENCOUNTER — APPOINTMENT (OUTPATIENT)
Dept: MRI IMAGING | Facility: HOSPITAL | Age: 32
End: 2022-06-10
Payer: COMMERCIAL

## 2022-06-10 DIAGNOSIS — Z90.49 ACQUIRED ABSENCE OF OTHER SPECIFIED PARTS OF DIGESTIVE TRACT: Chronic | ICD-10-CM

## 2022-06-10 PROCEDURE — 72197 MRI PELVIS W/O & W/DYE: CPT

## 2022-06-10 PROCEDURE — 72197 MRI PELVIS W/O & W/DYE: CPT | Mod: 26

## 2022-06-10 PROCEDURE — A9585: CPT

## 2022-06-23 ENCOUNTER — APPOINTMENT (OUTPATIENT)
Dept: MRI IMAGING | Facility: CLINIC | Age: 32
End: 2022-06-23

## 2022-07-08 ENCOUNTER — OUTPATIENT (OUTPATIENT)
Dept: OUTPATIENT SERVICES | Facility: HOSPITAL | Age: 32
LOS: 1 days | End: 2022-07-08
Payer: COMMERCIAL

## 2022-07-08 ENCOUNTER — APPOINTMENT (OUTPATIENT)
Dept: ULTRASOUND IMAGING | Facility: HOSPITAL | Age: 32
End: 2022-07-08

## 2022-07-08 DIAGNOSIS — Z90.49 ACQUIRED ABSENCE OF OTHER SPECIFIED PARTS OF DIGESTIVE TRACT: Chronic | ICD-10-CM

## 2022-07-08 DIAGNOSIS — Z00.8 ENCOUNTER FOR OTHER GENERAL EXAMINATION: ICD-10-CM

## 2022-07-08 PROCEDURE — 76700 US EXAM ABDOM COMPLETE: CPT | Mod: 26

## 2022-07-08 PROCEDURE — 76700 US EXAM ABDOM COMPLETE: CPT

## 2022-08-17 ENCOUNTER — OUTPATIENT (OUTPATIENT)
Dept: OUTPATIENT SERVICES | Facility: HOSPITAL | Age: 32
LOS: 1 days | End: 2022-08-17

## 2022-08-17 VITALS
WEIGHT: 220.02 LBS | SYSTOLIC BLOOD PRESSURE: 111 MMHG | DIASTOLIC BLOOD PRESSURE: 77 MMHG | OXYGEN SATURATION: 99 % | TEMPERATURE: 98 F | RESPIRATION RATE: 16 BRPM | HEIGHT: 63 IN | HEART RATE: 67 BPM

## 2022-08-17 DIAGNOSIS — N93.9 ABNORMAL UTERINE AND VAGINAL BLEEDING, UNSPECIFIED: ICD-10-CM

## 2022-08-17 DIAGNOSIS — Z87.42 PERSONAL HISTORY OF OTHER DISEASES OF THE FEMALE GENITAL TRACT: ICD-10-CM

## 2022-08-17 DIAGNOSIS — N85.00 ENDOMETRIAL HYPERPLASIA, UNSPECIFIED: ICD-10-CM

## 2022-08-17 DIAGNOSIS — Z90.49 ACQUIRED ABSENCE OF OTHER SPECIFIED PARTS OF DIGESTIVE TRACT: Chronic | ICD-10-CM

## 2022-08-17 LAB
A1C WITH ESTIMATED AVERAGE GLUCOSE RESULT: 5.1 % — SIGNIFICANT CHANGE UP (ref 4–5.6)
ANION GAP SERPL CALC-SCNC: 12 MMOL/L — SIGNIFICANT CHANGE UP (ref 7–14)
BUN SERPL-MCNC: 14 MG/DL — SIGNIFICANT CHANGE UP (ref 7–23)
CALCIUM SERPL-MCNC: 9 MG/DL — SIGNIFICANT CHANGE UP (ref 8.4–10.5)
CHLORIDE SERPL-SCNC: 102 MMOL/L — SIGNIFICANT CHANGE UP (ref 98–107)
CO2 SERPL-SCNC: 25 MMOL/L — SIGNIFICANT CHANGE UP (ref 22–31)
CREAT SERPL-MCNC: 0.75 MG/DL — SIGNIFICANT CHANGE UP (ref 0.5–1.3)
EGFR: 107 ML/MIN/1.73M2 — SIGNIFICANT CHANGE UP
ESTIMATED AVERAGE GLUCOSE: 100 — SIGNIFICANT CHANGE UP
GLUCOSE SERPL-MCNC: 87 MG/DL — SIGNIFICANT CHANGE UP (ref 70–99)
HCG UR QL: NEGATIVE — SIGNIFICANT CHANGE UP
HCT VFR BLD CALC: 42.8 % — SIGNIFICANT CHANGE UP (ref 34.5–45)
HGB BLD-MCNC: 13.8 G/DL — SIGNIFICANT CHANGE UP (ref 11.5–15.5)
MCHC RBC-ENTMCNC: 29.1 PG — SIGNIFICANT CHANGE UP (ref 27–34)
MCHC RBC-ENTMCNC: 32.2 GM/DL — SIGNIFICANT CHANGE UP (ref 32–36)
MCV RBC AUTO: 90.1 FL — SIGNIFICANT CHANGE UP (ref 80–100)
NRBC # BLD: 0 /100 WBCS — SIGNIFICANT CHANGE UP (ref 0–0)
NRBC # FLD: 0 K/UL — SIGNIFICANT CHANGE UP (ref 0–0)
PLATELET # BLD AUTO: 258 K/UL — SIGNIFICANT CHANGE UP (ref 150–400)
POTASSIUM SERPL-MCNC: 4.2 MMOL/L — SIGNIFICANT CHANGE UP (ref 3.5–5.3)
POTASSIUM SERPL-SCNC: 4.2 MMOL/L — SIGNIFICANT CHANGE UP (ref 3.5–5.3)
RBC # BLD: 4.75 M/UL — SIGNIFICANT CHANGE UP (ref 3.8–5.2)
RBC # FLD: 12.3 % — SIGNIFICANT CHANGE UP (ref 10.3–14.5)
SODIUM SERPL-SCNC: 139 MMOL/L — SIGNIFICANT CHANGE UP (ref 135–145)
WBC # BLD: 4.44 K/UL — SIGNIFICANT CHANGE UP (ref 3.8–10.5)
WBC # FLD AUTO: 4.44 K/UL — SIGNIFICANT CHANGE UP (ref 3.8–10.5)

## 2022-08-17 RX ORDER — SODIUM CHLORIDE 9 MG/ML
1000 INJECTION, SOLUTION INTRAVENOUS
Refills: 0 | Status: DISCONTINUED | OUTPATIENT
Start: 2022-09-01 | End: 2022-09-15

## 2022-08-17 NOTE — H&P PST ADULT - HISTORY OF PRESENT ILLNESS
32 y.o. female , LMP 22  presents to PST with preop diagnosis of endometrial hyperplasia, unspecified, c/o irregular heavy menstrual periods, cramping, s/p transvaginal ultrasound,  scheduled for hysteroscopy dilation curettage with myosure, mirena intra uterine device insertion

## 2022-08-17 NOTE — H&P PST ADULT - PROBLEM SELECTOR PLAN 1
pt scheduled for hysteroscopy dilation curettage with myosure, mirena intra uterine device insertion on 09/01/22  Preop instructions provided. Pt verbalized understanding.   Pepcid for GI prophylaxis with written and verbal instruction provided  COVID test to be done 72 hrs preop, list of locations provided

## 2022-08-17 NOTE — H&P PST ADULT - ATTENDING COMMENTS
Attending note    32 y.o. , abnormal uterine bleeding presents today for hysteroscopy D+C Myosure resectoscope and Mirena IUD insertion. On imaging , the endometrium noted thickened and with cysts within it suspicious for endometrial hyperplasia, unspecified. Her bleeding is irregular,  prolonged and heavy. She verbalized understanding after discussion of all findings and planned procedure. All her questions were answered to her satisfaction.

## 2022-08-30 LAB — SARS-COV-2 RNA SPEC QL NAA+PROBE: SIGNIFICANT CHANGE UP

## 2022-08-31 ENCOUNTER — TRANSCRIPTION ENCOUNTER (OUTPATIENT)
Age: 32
End: 2022-08-31

## 2022-09-01 ENCOUNTER — OUTPATIENT (OUTPATIENT)
Dept: OUTPATIENT SERVICES | Facility: HOSPITAL | Age: 32
LOS: 1 days | Discharge: HOME | End: 2022-09-01

## 2022-09-01 ENCOUNTER — TRANSCRIPTION ENCOUNTER (OUTPATIENT)
Age: 32
End: 2022-09-01

## 2022-09-01 VITALS
OXYGEN SATURATION: 96 % | RESPIRATION RATE: 12 BRPM | HEART RATE: 76 BPM | SYSTOLIC BLOOD PRESSURE: 112 MMHG | DIASTOLIC BLOOD PRESSURE: 79 MMHG

## 2022-09-01 VITALS
TEMPERATURE: 99 F | HEART RATE: 80 BPM | RESPIRATION RATE: 18 BRPM | SYSTOLIC BLOOD PRESSURE: 122 MMHG | WEIGHT: 220.02 LBS | OXYGEN SATURATION: 100 % | DIASTOLIC BLOOD PRESSURE: 91 MMHG | HEIGHT: 63 IN

## 2022-09-01 DIAGNOSIS — N93.9 ABNORMAL UTERINE AND VAGINAL BLEEDING, UNSPECIFIED: ICD-10-CM

## 2022-09-01 DIAGNOSIS — Z90.49 ACQUIRED ABSENCE OF OTHER SPECIFIED PARTS OF DIGESTIVE TRACT: Chronic | ICD-10-CM

## 2022-09-01 LAB — HCG UR QL: NEGATIVE — SIGNIFICANT CHANGE UP

## 2022-09-01 DEVICE — MYOSURE TISSUE REMOVAL DEVICE REACH: Type: IMPLANTABLE DEVICE | Status: FUNCTIONAL

## 2022-09-01 DEVICE — IUD MIRENA: Type: IMPLANTABLE DEVICE | Status: FUNCTIONAL

## 2022-09-01 NOTE — BRIEF OPERATIVE NOTE - NSICDXBRIEFPROCEDURE_GEN_ALL_CORE_FT
PROCEDURES:  Exam under anesthesia, vagina 01-Sep-2022 15:56:16  Barry Oliver  Hysteroscopy, with dilation and curettage 01-Sep-2022 15:56:27  Barry Oliver  Dilation and curettage, uterus, with uterine mass removal using MyoSure tissue removal system 01-Sep-2022 15:56:45  Barry Oliver  Intrauterine device insertion 01-Sep-2022 16:00:50  Barry Oliver

## 2022-09-01 NOTE — ASU DISCHARGE PLAN (ADULT/PEDIATRIC) - CARE PROVIDER_API CALL
Jayshree Wallace)  Obstetrics and Gynecology  [address] [place] [address]  Phone: [phone]  Fax: [phone]  Follow Up Time: 2 weeks

## 2022-09-01 NOTE — ASU DISCHARGE PLAN (ADULT/PEDIATRIC) - WILL THE PATIENT ACCEPT THE PFIZER COVID-19 VACCINE IF ELIGIBLE AND IT IS AVAILABLE?
Gabapentin Pregnancy And Lactation Text: This medication is Pregnancy Category C and isn't considered safe during pregnancy. It is excreted in breast milk. Not applicable

## 2022-09-01 NOTE — BRIEF OPERATIVE NOTE - OPERATION/FINDINGS
EUA revealed small anteverted uterus. No adnexal masses palpated bilaterally. Diagnostic hysteroscopy revealed polypoid-like tissue on the posterior wall of the intrauterine cavity. Bilateral ostia visualized and wnl.

## 2022-09-01 NOTE — ASU DISCHARGE PLAN (ADULT/PEDIATRIC) - NS MD DC FALL RISK RISK
For information on Fall & Injury Prevention, visit: [email or web address] OR  [email or web address] OR  https://www.cdc.gov/steadi/patient.html

## 2022-09-01 NOTE — BRIEF OPERATIVE NOTE - NSICDXBRIEFPOSTOP_GEN_ALL_CORE_FT
POST-OP DIAGNOSIS:  Endometrial hyperplasia, unspecified 01-Sep-2022 15:57:35  Barry Oliver  Abnormal uterine and vaginal bleeding, unspecified 01-Sep-2022 15:57:54  Barry Oliver

## 2022-09-01 NOTE — BRIEF OPERATIVE NOTE - PRIMARY SURGEON
Tracy Nix  [address] [address]          12/16/19    Dear Tracy Nix      Our office received a referral for you to the Bariatric Surgery program. This has been  ordered on your behalf by Sonya Castellanos MD.  We have been unable to reach you by phone, and we would like to assist you in moving forward with this program. The first step in the process is to watch or attend a seminar. Enclosed you will find a flyer which includes information on both seminar formats.  When you have completed the seminar, we will contact you to schedule a consultation.          Please feel free to call our office at [phone] if you have any questions regarding the bariatric program or the seminar.      We look forward to assisting you in your journey to better health,       [facility]  Dr. Jose De Paz PA-C           Rodrigo

## 2022-09-01 NOTE — BRIEF OPERATIVE NOTE - NSICDXBRIEFPREOP_GEN_ALL_CORE_FT
PRE-OP DIAGNOSIS:  Endometrial hyperplasia, unspecified 01-Sep-2022 15:57:06  Barry Oliver  Abnormal uterine and vaginal bleeding, unspecified 01-Sep-2022 15:57:19  Barry Oliver

## 2022-09-01 NOTE — ASU DISCHARGE PLAN (ADULT/PEDIATRIC) - NURSING INSTRUCTIONS
Last dose of TYLENOL for pain management was at 3:30pm. Next dose of TYLENOL may be taken at or after 9:30pm if needed. DO NOT take any additional products containing TYLENOL or ACETAMINOPHEN, such as VICODIN, PERCOCET, NORCO, EXCEDRIN, and any over-the-counter cold medications. DO NOT CONSUME MORE THAN 9268-4949 MG OF TYLENOL (acetaminophen) in a 24-hour period. Next dose of NDAIDS (ibuprofen, motrin, advil, naproxen, aleve, or aspirin) may be taken at or after 9:30pm if needed.

## 2022-09-02 ENCOUNTER — RESULT REVIEW (OUTPATIENT)
Age: 32
End: 2022-09-02

## 2022-09-02 ENCOUNTER — EMERGENCY (EMERGENCY)
Facility: HOSPITAL | Age: 32
LOS: 1 days | Discharge: HOME | End: 2022-09-02
Attending: EMERGENCY MEDICINE | Admitting: EMERGENCY MEDICINE

## 2022-09-02 VITALS
RESPIRATION RATE: 17 BRPM | DIASTOLIC BLOOD PRESSURE: 64 MMHG | SYSTOLIC BLOOD PRESSURE: 96 MMHG | OXYGEN SATURATION: 100 % | HEIGHT: 63 IN | TEMPERATURE: 98 F | HEART RATE: 98 BPM

## 2022-09-02 VITALS
OXYGEN SATURATION: 99 % | HEART RATE: 63 BPM | RESPIRATION RATE: 18 BRPM | DIASTOLIC BLOOD PRESSURE: 68 MMHG | SYSTOLIC BLOOD PRESSURE: 107 MMHG

## 2022-09-02 DIAGNOSIS — Z90.49 ACQUIRED ABSENCE OF OTHER SPECIFIED PARTS OF DIGESTIVE TRACT: Chronic | ICD-10-CM

## 2022-09-02 PROBLEM — E28.2 POLYCYSTIC OVARIAN SYNDROME: Chronic | Status: ACTIVE | Noted: 2022-08-17

## 2022-09-02 PROBLEM — N85.00 ENDOMETRIAL HYPERPLASIA, UNSPECIFIED: Chronic | Status: ACTIVE | Noted: 2022-08-17

## 2022-09-02 LAB
ALBUMIN SERPL ELPH-MCNC: 4.3 G/DL — SIGNIFICANT CHANGE UP (ref 3.3–5)
ALP SERPL-CCNC: 56 U/L — SIGNIFICANT CHANGE UP (ref 40–120)
ALT FLD-CCNC: 30 U/L — SIGNIFICANT CHANGE UP (ref 4–33)
ANION GAP SERPL CALC-SCNC: 11 MMOL/L — SIGNIFICANT CHANGE UP (ref 7–14)
AST SERPL-CCNC: 23 U/L — SIGNIFICANT CHANGE UP (ref 4–32)
B PERT DNA SPEC QL NAA+PROBE: SIGNIFICANT CHANGE UP
B PERT+PARAPERT DNA PNL SPEC NAA+PROBE: SIGNIFICANT CHANGE UP
BASOPHILS # BLD AUTO: 0.01 K/UL — SIGNIFICANT CHANGE UP (ref 0–0.2)
BASOPHILS NFR BLD AUTO: 0.1 % — SIGNIFICANT CHANGE UP (ref 0–2)
BILIRUB SERPL-MCNC: 0.6 MG/DL — SIGNIFICANT CHANGE UP (ref 0.2–1.2)
BORDETELLA PARAPERTUSSIS (RAPRVP): SIGNIFICANT CHANGE UP
BUN SERPL-MCNC: 9 MG/DL — SIGNIFICANT CHANGE UP (ref 7–23)
C PNEUM DNA SPEC QL NAA+PROBE: SIGNIFICANT CHANGE UP
CALCIUM SERPL-MCNC: 8.9 MG/DL — SIGNIFICANT CHANGE UP (ref 8.4–10.5)
CHLORIDE SERPL-SCNC: 104 MMOL/L — SIGNIFICANT CHANGE UP (ref 98–107)
CO2 SERPL-SCNC: 22 MMOL/L — SIGNIFICANT CHANGE UP (ref 22–31)
CREAT SERPL-MCNC: 0.74 MG/DL — SIGNIFICANT CHANGE UP (ref 0.5–1.3)
D DIMER BLD IA.RAPID-MCNC: <150 NG/ML DDU — SIGNIFICANT CHANGE UP
EGFR: 109 ML/MIN/1.73M2 — SIGNIFICANT CHANGE UP
EOSINOPHIL # BLD AUTO: 0 K/UL — SIGNIFICANT CHANGE UP (ref 0–0.5)
EOSINOPHIL NFR BLD AUTO: 0 % — SIGNIFICANT CHANGE UP (ref 0–6)
FLUAV SUBTYP SPEC NAA+PROBE: SIGNIFICANT CHANGE UP
FLUBV RNA SPEC QL NAA+PROBE: SIGNIFICANT CHANGE UP
GLUCOSE SERPL-MCNC: 99 MG/DL — SIGNIFICANT CHANGE UP (ref 70–99)
HADV DNA SPEC QL NAA+PROBE: SIGNIFICANT CHANGE UP
HCG SERPL-ACNC: <5 MIU/ML — SIGNIFICANT CHANGE UP
HCOV 229E RNA SPEC QL NAA+PROBE: SIGNIFICANT CHANGE UP
HCOV HKU1 RNA SPEC QL NAA+PROBE: SIGNIFICANT CHANGE UP
HCOV NL63 RNA SPEC QL NAA+PROBE: SIGNIFICANT CHANGE UP
HCOV OC43 RNA SPEC QL NAA+PROBE: SIGNIFICANT CHANGE UP
HCT VFR BLD CALC: 39.6 % — SIGNIFICANT CHANGE UP (ref 34.5–45)
HGB BLD-MCNC: 13.5 G/DL — SIGNIFICANT CHANGE UP (ref 11.5–15.5)
HMPV RNA SPEC QL NAA+PROBE: SIGNIFICANT CHANGE UP
HPIV1 RNA SPEC QL NAA+PROBE: SIGNIFICANT CHANGE UP
HPIV2 RNA SPEC QL NAA+PROBE: SIGNIFICANT CHANGE UP
HPIV3 RNA SPEC QL NAA+PROBE: SIGNIFICANT CHANGE UP
HPIV4 RNA SPEC QL NAA+PROBE: SIGNIFICANT CHANGE UP
IANC: 10.15 K/UL — HIGH (ref 1.8–7.4)
IMM GRANULOCYTES NFR BLD AUTO: 0.5 % — SIGNIFICANT CHANGE UP (ref 0–1.5)
LYMPHOCYTES # BLD AUTO: 1.84 K/UL — SIGNIFICANT CHANGE UP (ref 1–3.3)
LYMPHOCYTES # BLD AUTO: 14.6 % — SIGNIFICANT CHANGE UP (ref 13–44)
M PNEUMO DNA SPEC QL NAA+PROBE: SIGNIFICANT CHANGE UP
MAGNESIUM SERPL-MCNC: 1.9 MG/DL — SIGNIFICANT CHANGE UP (ref 1.6–2.6)
MCHC RBC-ENTMCNC: 29.7 PG — SIGNIFICANT CHANGE UP (ref 27–34)
MCHC RBC-ENTMCNC: 34.1 GM/DL — SIGNIFICANT CHANGE UP (ref 32–36)
MCV RBC AUTO: 87.2 FL — SIGNIFICANT CHANGE UP (ref 80–100)
MONOCYTES # BLD AUTO: 0.54 K/UL — SIGNIFICANT CHANGE UP (ref 0–0.9)
MONOCYTES NFR BLD AUTO: 4.3 % — SIGNIFICANT CHANGE UP (ref 2–14)
NEUTROPHILS # BLD AUTO: 10.15 K/UL — HIGH (ref 1.8–7.4)
NEUTROPHILS NFR BLD AUTO: 80.5 % — HIGH (ref 43–77)
NRBC # BLD: 0 /100 WBCS — SIGNIFICANT CHANGE UP (ref 0–0)
NRBC # FLD: 0 K/UL — SIGNIFICANT CHANGE UP (ref 0–0)
PHOSPHATE SERPL-MCNC: 2.4 MG/DL — LOW (ref 2.5–4.5)
PLATELET # BLD AUTO: 237 K/UL — SIGNIFICANT CHANGE UP (ref 150–400)
POTASSIUM SERPL-MCNC: 4 MMOL/L — SIGNIFICANT CHANGE UP (ref 3.5–5.3)
POTASSIUM SERPL-SCNC: 4 MMOL/L — SIGNIFICANT CHANGE UP (ref 3.5–5.3)
PROT SERPL-MCNC: 7.3 G/DL — SIGNIFICANT CHANGE UP (ref 6–8.3)
RAPID RVP RESULT: SIGNIFICANT CHANGE UP
RBC # BLD: 4.54 M/UL — SIGNIFICANT CHANGE UP (ref 3.8–5.2)
RBC # FLD: 12 % — SIGNIFICANT CHANGE UP (ref 10.3–14.5)
RSV RNA SPEC QL NAA+PROBE: SIGNIFICANT CHANGE UP
RV+EV RNA SPEC QL NAA+PROBE: SIGNIFICANT CHANGE UP
SARS-COV-2 RNA SPEC QL NAA+PROBE: SIGNIFICANT CHANGE UP
SODIUM SERPL-SCNC: 137 MMOL/L — SIGNIFICANT CHANGE UP (ref 135–145)
WBC # BLD: 12.6 K/UL — HIGH (ref 3.8–10.5)
WBC # FLD AUTO: 12.6 K/UL — HIGH (ref 3.8–10.5)

## 2022-09-02 PROCEDURE — 70450 CT HEAD/BRAIN W/O DYE: CPT | Mod: 26,MA

## 2022-09-02 PROCEDURE — 71046 X-RAY EXAM CHEST 2 VIEWS: CPT | Mod: 26

## 2022-09-02 PROCEDURE — 88305 TISSUE EXAM BY PATHOLOGIST: CPT | Mod: 26

## 2022-09-02 PROCEDURE — 93010 ELECTROCARDIOGRAM REPORT: CPT

## 2022-09-02 PROCEDURE — 99285 EMERGENCY DEPT VISIT HI MDM: CPT | Mod: 25

## 2022-09-02 RX ORDER — IBUPROFEN 200 MG
600 TABLET ORAL ONCE
Refills: 0 | Status: COMPLETED | OUTPATIENT
Start: 2022-09-02 | End: 2022-09-02

## 2022-09-02 RX ORDER — SODIUM CHLORIDE 9 MG/ML
1000 INJECTION INTRAMUSCULAR; INTRAVENOUS; SUBCUTANEOUS ONCE
Refills: 0 | Status: COMPLETED | OUTPATIENT
Start: 2022-09-02 | End: 2022-09-02

## 2022-09-02 RX ORDER — ONDANSETRON 8 MG/1
4 TABLET, FILM COATED ORAL ONCE
Refills: 0 | Status: COMPLETED | OUTPATIENT
Start: 2022-09-02 | End: 2022-09-02

## 2022-09-02 RX ADMIN — SODIUM CHLORIDE 1000 MILLILITER(S): 9 INJECTION INTRAMUSCULAR; INTRAVENOUS; SUBCUTANEOUS at 12:37

## 2022-09-02 NOTE — ED ADULT NURSE NOTE - OBJECTIVE STATEMENT
Imani RN: A&Ox4, PMH of irregular periods, s/p D/C and Mirena placement under general anesthesia, presents to ED for chest tightness/chest pain that radiates to middle back since last night. Respirations are even and unlabored, sating at 100% on RA, 20 G to L AC placed, labs sent.

## 2022-09-02 NOTE — ED ADULT TRIAGE NOTE - CHIEF COMPLAINT QUOTE
Pt s/p d&c and IUD placement under general anesthesia c/o sob and chest tightness. Pt told by OB-GYN to come to ED for evaluation. Pt breathing unlabored 100% sat in triage

## 2022-09-02 NOTE — ED ADULT NURSE NOTE - NSSUHOSCREENINGYN_ED_ALL_ED
Yes - the patient is able to be screened Showering allowed/Stairs allowed/Walking - Indoors allowed/No heavy lifting/straining/Walking - Outdoors allowed/Follow Instructions Provided by your Surgical Team

## 2022-09-02 NOTE — ED PROVIDER NOTE - NSICDXFAMILYHX_GEN_ALL_CORE_FT
Cardiac
FAMILY HISTORY:  Mother  Still living? Unknown  Family history of diabetes mellitus, Age at diagnosis: Age Unknown    Aunt  Still living? Unknown  Family history of thyroid disease, Age at diagnosis: Age Unknown

## 2022-09-02 NOTE — ED PROVIDER NOTE - CLINICAL SUMMARY MEDICAL DECISION MAKING FREE TEXT BOX
33 y/o female w/ PMH heavy irregular periods on Fe supplementation w/ PSH open appendectomy s/p general anesthesia yesterday for D&C/biopsy, hysteroscopy, IUD placement c/o 1 day history of difficulty breathing, SOB, and intermittent localized chest tightness worsened w/ deep inspiration that began when the patient was sleeping yesterday. Admits to nausea and mild abdominal pain. Low risk for PE (not tachy, tachypneic, satting well on room air). Will pursue d-dimer. Screen for anemia/electrolytes, and reassess w/ likely d/c w/ close PCP f/u.

## 2022-09-02 NOTE — ED ADULT NURSE NOTE - TEMPLATE
Patient will receive a phone call to set up the appointment for a colonoscopy with GI. Also called patient to give him the number for GI.    General

## 2022-09-02 NOTE — ED PROVIDER NOTE - NS ED ROS FT
GENERAL: No fever or chills  EYES: no change in vision   HEENT: no trouble swallowing or speaking   CARDIAC: no chest pain   PULMONARY: + cough or SOB  GI:  No abdominal pain  : No changes in urination   SKIN: no rashes   NEURO: no headache   MSK: No joint pain     All other ROS negative unless otherwise specified in HPI.

## 2022-09-02 NOTE — ED PROVIDER NOTE - OBJECTIVE STATEMENT
33 y/o female w/ PMH heavy irregular periods on Fe supplementation w/ PSH open appendectomy s/p general anesthesia yesterday for D&C/biopsy, hysteroscopy, IUD placement c/o 1 day history of difficulty breathing, SOB, and intermittent localized chest tightness worsened w/ deep inspiration that began when the patient was sleeping yesterday. Admits to nausea and mild abdominal pain. Denies fevers, chills, vomiting, dysuria, hematuria.

## 2022-09-02 NOTE — ED PROVIDER NOTE - PATIENT PORTAL LINK FT
You can access the FollowMyHealth Patient Portal offered by Health system by registering at the following website: [email or web address]. By joining [employer]’s FollowMyHealth portal, you will also be able to view your health information using other applications (apps) compatible with our system.

## 2022-09-02 NOTE — ED PROVIDER NOTE - COVID-19  TEST TYPE
11/22/2017      RE: Mervat Best  [id number] [facility] [address]       SUBJECTIVE: 19 year old male complaining of cough for 8 day(s).   The patient describes no sore throat or fevers, mild congestion  Has not tried to treat yet with any medications.     Review Of Systems  Skin: negative  Eyes: negative    Cardiovascular: negative  Gastrointestinal: negative  Genitourinary: negative  Musculoskeletal: negative  Neurologic: negative  Psychiatric: negative  Hematologic/Lymphatic/Immunologic: negative  Endocrine: negative    VSS, afebrile, /76, HR 65, RR14  OBJECTIVE: The patient appears healthy, alert and no distress.   EARS: negative  NOSE/SINUS: Nares normal. Septum midline. Mucosa abnormal, red,erythematous. No drainage or sinus tenderness.   THROAT: slightly red, postnasal drip present, no exudates  NECK:Neck supple. No adenopathy. Thyroid symmetric, normal size,, Carotids without bruits.   CHEST: Clear to auscultation    ASSESSMENT:   20 yo male with URI and post nasal drip    PLAN  Tessalon perles  flonase  Stay well hydrated  Monitor for fevers  Return if worse  Dr Ozzie Horne MD     MOLECULAR PCR

## 2022-09-02 NOTE — ED PROVIDER NOTE - ATTENDING CONTRIBUTION TO CARE
32F h/o irreg periods, heavy, taking iron; pt had GETA for D & C and bx and IUD placement yesterday.  Last night had difficulty breathing and chest tightness worse with deep breathing.  Also with nausea.  Satting well.  BP low initially but better in room 120-130.  Not tachypneic.  Nontender abd.  No longer SOB.  Currently feeling nauseous and having a bit of cough, no further CP.  Plan check labs, EKG, dimer.  Potentially having laryngeal irritation due to recent ET intubation, possible contribution from undx sleep apnea.  Labs benign.  OK to d/c home, rx ibuprofen for anti-inflammatory effect for airway, follow up with PMD for sleep study.  Pt also reports tingling of extremities, with subj decr sensation to R hand and L foot and L forehead.  Not a neurologic distribution for stroke, possibly related to anesthesia or breathing difficulty.  No focal motor deficit. Will check CTH, if normal can follow up with neurology as outpt.   VS:  unremarkable    GEN - NAD;   well appearing;   A+O x3   HEAD - NC/AT     ENT - PEERL, EOMI, mucous membranes    moist , no discharge      NECK: Neck supple, non-tender without lymphadenopathy, no masses, no JVD  PULM - CTA b/l,  symmetric breath sounds  COR -  normal heart sounds    ABD - , ND, NT, soft,  BACK - no CVA tenderness, nontender spine     EXTREMS - no edema, no deformity, warm and well perfused    SKIN - no rash    or bruising      NEUROLOGIC - alert, face symmetric, speech fluent, sensation nl except decreased R hand L foot and L forehead, motor no focal deficit.

## 2022-09-07 LAB — SURGICAL PATHOLOGY STUDY: SIGNIFICANT CHANGE UP

## 2022-09-08 ENCOUNTER — INPATIENT (INPATIENT)
Facility: HOSPITAL | Age: 32
LOS: 0 days | Discharge: HOME | DRG: 948 | End: 2022-09-09
Attending: STUDENT IN AN ORGANIZED HEALTH CARE EDUCATION/TRAINING PROGRAM | Admitting: STUDENT IN AN ORGANIZED HEALTH CARE EDUCATION/TRAINING PROGRAM
Payer: COMMERCIAL

## 2022-09-08 VITALS
OXYGEN SATURATION: 100 % | SYSTOLIC BLOOD PRESSURE: 126 MMHG | RESPIRATION RATE: 18 BRPM | WEIGHT: 220.02 LBS | DIASTOLIC BLOOD PRESSURE: 79 MMHG | TEMPERATURE: 98 F | HEART RATE: 89 BPM | HEIGHT: 63 IN

## 2022-09-08 DIAGNOSIS — R06.02 SHORTNESS OF BREATH: ICD-10-CM

## 2022-09-08 DIAGNOSIS — Z90.49 ACQUIRED ABSENCE OF OTHER SPECIFIED PARTS OF DIGESTIVE TRACT: Chronic | ICD-10-CM

## 2022-09-08 LAB
ALBUMIN SERPL ELPH-MCNC: 3.5 G/DL — SIGNIFICANT CHANGE UP (ref 3.5–5)
ALP SERPL-CCNC: 63 U/L — SIGNIFICANT CHANGE UP (ref 40–120)
ALT FLD-CCNC: 29 U/L DA — SIGNIFICANT CHANGE UP (ref 10–60)
ANION GAP SERPL CALC-SCNC: 7 MMOL/L — SIGNIFICANT CHANGE UP (ref 5–17)
AST SERPL-CCNC: 19 U/L — SIGNIFICANT CHANGE UP (ref 10–40)
BASOPHILS # BLD AUTO: 0.03 K/UL — SIGNIFICANT CHANGE UP (ref 0–0.2)
BASOPHILS NFR BLD AUTO: 0.4 % — SIGNIFICANT CHANGE UP (ref 0–2)
BILIRUB SERPL-MCNC: 0.4 MG/DL — SIGNIFICANT CHANGE UP (ref 0.2–1.2)
BUN SERPL-MCNC: 11 MG/DL — SIGNIFICANT CHANGE UP (ref 7–18)
CALCIUM SERPL-MCNC: 8.9 MG/DL — SIGNIFICANT CHANGE UP (ref 8.4–10.5)
CHLORIDE SERPL-SCNC: 110 MMOL/L — HIGH (ref 96–108)
CO2 SERPL-SCNC: 21 MMOL/L — LOW (ref 22–31)
CREAT SERPL-MCNC: 0.81 MG/DL — SIGNIFICANT CHANGE UP (ref 0.5–1.3)
EGFR: 98 ML/MIN/1.73M2 — SIGNIFICANT CHANGE UP
EOSINOPHIL # BLD AUTO: 0.04 K/UL — SIGNIFICANT CHANGE UP (ref 0–0.5)
EOSINOPHIL NFR BLD AUTO: 0.5 % — SIGNIFICANT CHANGE UP (ref 0–6)
GLUCOSE SERPL-MCNC: 95 MG/DL — SIGNIFICANT CHANGE UP (ref 70–99)
HCG SERPL-ACNC: <1 MIU/ML — SIGNIFICANT CHANGE UP
HCT VFR BLD CALC: 40 % — SIGNIFICANT CHANGE UP (ref 34.5–45)
HGB BLD-MCNC: 13.5 G/DL — SIGNIFICANT CHANGE UP (ref 11.5–15.5)
HIV 1 & 2 AB SERPL IA.RAPID: SIGNIFICANT CHANGE UP
IMM GRANULOCYTES NFR BLD AUTO: 0.5 % — SIGNIFICANT CHANGE UP (ref 0–1.5)
LYMPHOCYTES # BLD AUTO: 2.77 K/UL — SIGNIFICANT CHANGE UP (ref 1–3.3)
LYMPHOCYTES # BLD AUTO: 35.7 % — SIGNIFICANT CHANGE UP (ref 13–44)
MCHC RBC-ENTMCNC: 29.6 PG — SIGNIFICANT CHANGE UP (ref 27–34)
MCHC RBC-ENTMCNC: 33.8 GM/DL — SIGNIFICANT CHANGE UP (ref 32–36)
MCV RBC AUTO: 87.7 FL — SIGNIFICANT CHANGE UP (ref 80–100)
MONOCYTES # BLD AUTO: 0.49 K/UL — SIGNIFICANT CHANGE UP (ref 0–0.9)
MONOCYTES NFR BLD AUTO: 6.3 % — SIGNIFICANT CHANGE UP (ref 2–14)
NEUTROPHILS # BLD AUTO: 4.38 K/UL — SIGNIFICANT CHANGE UP (ref 1.8–7.4)
NEUTROPHILS NFR BLD AUTO: 56.6 % — SIGNIFICANT CHANGE UP (ref 43–77)
NRBC # BLD: 0 /100 WBCS — SIGNIFICANT CHANGE UP (ref 0–0)
NT-PROBNP SERPL-SCNC: 34 PG/ML — SIGNIFICANT CHANGE UP (ref 0–125)
PLATELET # BLD AUTO: 233 K/UL — SIGNIFICANT CHANGE UP (ref 150–400)
POTASSIUM SERPL-MCNC: 4.2 MMOL/L — SIGNIFICANT CHANGE UP (ref 3.5–5.3)
POTASSIUM SERPL-SCNC: 4.2 MMOL/L — SIGNIFICANT CHANGE UP (ref 3.5–5.3)
PROT SERPL-MCNC: 7.3 G/DL — SIGNIFICANT CHANGE UP (ref 6–8.3)
RBC # BLD: 4.56 M/UL — SIGNIFICANT CHANGE UP (ref 3.8–5.2)
RBC # FLD: 12.1 % — SIGNIFICANT CHANGE UP (ref 10.3–14.5)
SODIUM SERPL-SCNC: 138 MMOL/L — SIGNIFICANT CHANGE UP (ref 135–145)
TROPONIN I, HIGH SENSITIVITY RESULT: 128.4 NG/L — HIGH
TROPONIN I, HIGH SENSITIVITY RESULT: 129.5 NG/L — HIGH
WBC # BLD: 7.75 K/UL — SIGNIFICANT CHANGE UP (ref 3.8–10.5)
WBC # FLD AUTO: 7.75 K/UL — SIGNIFICANT CHANGE UP (ref 3.8–10.5)

## 2022-09-08 PROCEDURE — 99223 1ST HOSP IP/OBS HIGH 75: CPT | Mod: GC

## 2022-09-08 PROCEDURE — 71046 X-RAY EXAM CHEST 2 VIEWS: CPT | Mod: 26

## 2022-09-08 PROCEDURE — 99285 EMERGENCY DEPT VISIT HI MDM: CPT

## 2022-09-08 PROCEDURE — 71275 CT ANGIOGRAPHY CHEST: CPT | Mod: 26,MA

## 2022-09-08 PROCEDURE — 93010 ELECTROCARDIOGRAM REPORT: CPT

## 2022-09-08 RX ORDER — ATORVASTATIN CALCIUM 80 MG/1
40 TABLET, FILM COATED ORAL AT BEDTIME
Refills: 0 | Status: DISCONTINUED | OUTPATIENT
Start: 2022-09-08 | End: 2022-09-09

## 2022-09-08 RX ORDER — HYDROXYZINE HCL 10 MG
25 TABLET ORAL ONCE
Refills: 0 | Status: COMPLETED | OUTPATIENT
Start: 2022-09-08 | End: 2022-09-08

## 2022-09-08 RX ORDER — ASPIRIN/CALCIUM CARB/MAGNESIUM 324 MG
324 TABLET ORAL ONCE
Refills: 0 | Status: COMPLETED | OUTPATIENT
Start: 2022-09-08 | End: 2022-09-08

## 2022-09-08 RX ORDER — METOPROLOL TARTRATE 50 MG
12.5 TABLET ORAL
Refills: 0 | Status: DISCONTINUED | OUTPATIENT
Start: 2022-09-08 | End: 2022-09-09

## 2022-09-08 RX ADMIN — Medication 12.5 MILLIGRAM(S): at 23:41

## 2022-09-08 RX ADMIN — ATORVASTATIN CALCIUM 40 MILLIGRAM(S): 80 TABLET, FILM COATED ORAL at 23:41

## 2022-09-08 RX ADMIN — Medication 25 MILLIGRAM(S): at 21:43

## 2022-09-08 RX ADMIN — Medication 324 MILLIGRAM(S): at 21:47

## 2022-09-08 NOTE — H&P ADULT - PROBLEM SELECTOR PROBLEM 4
Patient was referred for Disease Management with Clinical Pharmacist. CDM Team contacted patient, but patient declined to schedule. CDM Team will notify referring provider.     Hepatic steatosis

## 2022-09-08 NOTE — ED ADULT TRIAGE NOTE - CHIEF COMPLAINT QUOTE
difficulty breathing since 9/1 , as per pt she has been having intermittent chest pain and difficulty breathing since her d & c 09/ 01 difficulty breathing since 9/1 , as per pt she has been having intermittent chest pain and difficulty breathing since her d & c 09/ 01, as per pneumologist he wants a chest xray , abg , and cta

## 2022-09-08 NOTE — H&P ADULT - PROBLEM SELECTOR PLAN 2
- P/w SOB, acute onset of difficulty with deep inspiration, with chest pressure, exertional dyspnea  - CTA chest = negative for PE. Clear lungs  - COVID negative  - RVP negative 6 days ago  - Mucinex for phlegm  - Saturating 100% on room air  - Incentive spirometry - P/w SOB, acute onset of difficulty with deep inspiration, with chest pressure, exertional dyspnea(exercise intolerance) that started after intubation for procedure  - CTA chest = negative for PE. Clear lungs  - COVID negative  - RVP negative 6 days ago  - Mucinex for mucus  - Saturating 100% on room air

## 2022-09-08 NOTE — H&P ADULT - ASSESSMENT
Patient is a 32F, with PMHx of PCOS, irregular periods s/p hysteroscopy and D&C and Mirena IUD placement on 9/1, L4-S1 degenerative disc disease, and appendectomy, who comes in with shortness of breath and nonradiating midsternal chest pressure since her procedure on 9/1. Admitted for SOB and atypical chest pain.

## 2022-09-08 NOTE — H&P ADULT - HISTORY OF PRESENT ILLNESS
Patient is a 32F, with PMHx of PCOS, irregular periods s/p hysteroscopy and D&C and Mirena IUD placement on 9/1, L4-S1 degenerative disc disease, and appendectomy, who comes in with shortness of breath and nonradiating midsternal chest pressure since her procedure on 9/1. She was under general anesthesia with intubation and after the procedure, she has been having difficulty with deep inspiration and has sharp chest pain when she tries hard to breathe in deeply. She has been getting chest pressure and SOB with minimal exertion. She used to be able to cycle 30 minutes but now she can only do 1 minute before getting SOB.  She endorses chronic anxiety and 1 week of intermittent bilateral headache that comes and goes and is relieved by rest.  She also says that she has been having some mucus today and it is difficult to bring it up. She denies any nausea, vomiting, abdominal pain, joint pain, dysuria, hematuria, leg swelling, diarrhea, constipation, and dark/bloody stool.   Patient is a 32F, with PMHx of PCOS, irregular periods s/p hysteroscopy and D&C and Mirena IUD placement on 9/1, L4-S1 degenerative disc disease, and appendectomy, who comes in with shortness of breath and nonradiating midsternal chest pressure since her procedure on 9/1. She was under general anesthesia with intubation and after the procedure, she has been having difficulty with deep inspiration and has sharp chest pain when she tries hard to breathe in deeply. She has been getting chest pressure and SOB with minimal exertion. She used to be able to cycle 30 minutes but now she can only do 1 minute before getting SOB.  She endorses chronic anxiety and 1 week of intermittent bilateral headache that comes and goes and is relieved by rest.  She also says that she has been having some mucus today and it is difficult to bring it up. She denies any nausea, vomiting, abdominal pain, joint pain, dysuria, hematuria, leg swelling, diarrhea, constipation, and dark/bloody stool.

## 2022-09-08 NOTE — ED ADULT NURSE NOTE - NSICDXFAMILYHX_GEN_ALL_CORE_FT
FAMILY HISTORY:  Mother  Still living? Unknown  Family history of diabetes mellitus, Age at diagnosis: Age Unknown    Aunt  Still living? Unknown  Family history of thyroid disease, Age at diagnosis: Age Unknown

## 2022-09-08 NOTE — H&P ADULT - ATTENDING COMMENTS
Patient seen and examined at bedside.    Vital Signs Last 24 Hrs  T(C): 36.8 (08 Sep 2022 16:13), Max: 36.8 (08 Sep 2022 16:13)  T(F): 98.2 (08 Sep 2022 16:13), Max: 98.2 (08 Sep 2022 16:13)  HR: 89 (08 Sep 2022 16:13) (89 - 89)  BP: 126/79 (08 Sep 2022 16:13) (126/79 - 126/79)  BP(mean): --  RR: 18 (08 Sep 2022 16:13) (18 - 18)  SpO2: 100% (08 Sep 2022 16:13) (100% - 100%)    Parameters below as of 08 Sep 2022 16:13  Patient On (Oxygen Delivery Method): room air    Labs and imaging studies noted.    Assessment and plan: Patient is a 33 yo F, with PMHx of PCOS, irregular periods s/p recent hysteroscopy and D&C and Mirena IUD placement on 9/1, L4-S1 degenerative disc disease, and appendectomy, who comes in with shortness of breath and nonradiating midsternal chest pressure since her procedure on 9/1. She was under general anesthesia with intubation and after the procedure, she has been having difficulty with deep inspiration and has sharp chest pain when she tries hard to breathe in deeply. She has been getting chest pressure and SOB with minimal exertion. She used to be able to cycle 30 minutes but now she can only do 1 minute before getting SOB.  She endorses chronic anxiety and 1 week of intermittent bilateral headache that comes and goes and is relieved by rest.  She also says that she has been having some mucus today and it is difficult to bring it up. She denies any nausea, vomiting, abdominal pain, joint pain, dysuria, hematuria, leg swelling, diarrhea, constipation, and dark/bloody stool.        Patient seen and examined at bedside.    Vital Signs Last 24 Hrs  T(C): 36.8 (08 Sep 2022 16:13), Max: 36.8 (08 Sep 2022 16:13)  T(F): 98.2 (08 Sep 2022 16:13), Max: 98.2 (08 Sep 2022 16:13)  HR: 89 (08 Sep 2022 16:13) (89 - 89)  BP: 126/79 (08 Sep 2022 16:13) (126/79 - 126/79)  BP(mean): --  RR: 18 (08 Sep 2022 16:13) (18 - 18)  SpO2: 100% (08 Sep 2022 16:13) (100% - 100%)    Parameters below as of 08 Sep 2022 16:13  Patient On (Oxygen Delivery Method): room air    Labs and imaging studies noted.    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 She was under general anesthesia with intubation and after the procedure, she has been having difficulty with deep inspiration and has sharp chest pain when she tries hard to breathe in deeply. She has been getting chest pressure and SOB with minimal exertion. She used to be able to cycle 30 minutes but now she can only do 1 minute before getting SOB. She endorses chronic anxiety and 1 week of intermittent bilateral headache that comes and goes and is relieved by rest. Patient was seen by outpatient pulmonologist and was recommended to visit ED.    Patient seen and examined at bedside. AAOx3, NAD  neck supple  chest CTA, RRR, no MRG  abd soft, nt, nd  no leg edema    Vital Signs Last 24 Hrs  T(C): 36.8 (08 Sep 2022 16:13), Max: 36.8 (08 Sep 2022 16:13)  T(F): 98.2 (08 Sep 2022 16:13), Max: 98.2 (08 Sep 2022 16:13)  HR: 89 (08 Sep 2022 16:13) (89 - 89)  BP: 126/79 (08 Sep 2022 16:13) (126/79 - 126/79)  BP(mean): --  RR: 18 (08 Sep 2022 16:13) (18 - 18)  SpO2: 100% (08 Sep 2022 16:13) (100% - 100%)  Patient On (Oxygen Delivery Method): room air    Labs and imaging studies noted.  ekg nsr, TROP 129>128  CTA > clear lungs, no PE    Assessment and plan: 33 yo F with PMH of PCOS, irregular periods s/p recent hysteroscopy and D&C and Mirena IUD placement on Sep 1 this month, L4-S1 degenerative disc disease, and appendectomy, p/w c/o shortness of breath and non radiating midsternal chest pressure, noted to have elevated troponin.    Atypical chest pain a/w sob  PCOS> s/p recent hysteroscopy and D&C and Mirena IUD placement  Lumbar DJD  Hepatic steatosis    - symptoms started post recent procedure D&C and Mirena IUD placement.  - reports decreased exercise tolerance since then, reports increased HR at home on her fitbit.  - CTA > clear lungs, no PE  - noted trop 129> 128, EKG NSR  - trend cardiac enzymes  - follow lipid panel,   - Cardiology consult Dr. 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 She was under general anesthesia with intubation and after the procedure, she has been having difficulty with deep inspiration and has sharp chest pain when she tries hard to breathe in deeply. She has been getting chest pressure and SOB with minimal exertion. She used to be able to cycle 30 minutes but now she can only do 1 minute before getting SOB. She endorses chronic anxiety and 1 week of intermittent bilateral headache that comes and goes and is relieved by rest. Patient was seen by outpatient pulmonologist and was recommended to visit ED.    Patient seen and examined at bedside. AAOx3, NAD  neck supple  chest CTA, RRR, no MRG  abd soft, nt, nd  no leg edema    Vital Signs Last 24 Hrs  T(C): 36.8 (08 Sep 2022 16:13), Max: 36.8 (08 Sep 2022 16:13)  T(F): 98.2 (08 Sep 2022 16:13), Max: 98.2 (08 Sep 2022 16:13)  HR: 89 (08 Sep 2022 16:13) (89 - 89)  BP: 126/79 (08 Sep 2022 16:13) (126/79 - 126/79)  BP(mean): --  RR: 18 (08 Sep 2022 16:13) (18 - 18)  SpO2: 100% (08 Sep 2022 16:13) (100% - 100%)  Patient On (Oxygen Delivery Method): room air    Labs and imaging studies noted.  ekg nsr, TROP 129>128  CTA > clear lungs, no PE    Assessment and plan: 31 yo F with PMH of PCOS, irregular periods s/p recent hysteroscopy and D&C and Mirena IUD placement on Sep 1 this month, L4-S1 degenerative disc disease, and appendectomy, p/w c/o shortness of breath and non radiating midsternal chest pressure, noted to have elevated troponin admitted to r/o ACS    Atypical chest pain a/w sob  PCOS> s/p recent hysteroscopy and D&C and Mirena IUD placement  Lumbar DJD  Hepatic steatosis    - symptoms started post recent procedure D&C and Mirena IUD placement.  - reports decreased exercise tolerance since then, reports increased HR at home on her fitbit.  - CTA > clear lungs, no PE  - noted trop 129> 128, EKG NSR  - trend cardiac enzymes  - follow lipid panel, A1c. Patient w/ obesity, PCOS ( hyperestrogenic state), denies  family hx, tobacco use. other differential pericarditis.  - Cardiology consult Dr. Montalvo  - follow ECHO  - s/c Lovenox od. 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 She was under general anesthesia with intubation and after the procedure, she has been having difficulty with deep inspiration and has sharp chest pain when she tries hard to breathe in deeply. She has been getting chest pressure and SOB with minimal exertion. She used to be able to cycle 30 minutes but now she can only do 1 minute before getting SOB. She endorses chronic anxiety and 1 week of intermittent bilateral headache that comes and goes and is relieved by rest. Patient was seen by outpatient pulmonologist and was recommended to visit ED.    Patient seen and examined at bedside. AAOx3, NAD  neck supple  chest CTA, RRR, no MRG  abd soft, nt, nd  no leg edema    Vital Signs Last 24 Hrs  T(C): 36.8 (08 Sep 2022 16:13), Max: 36.8 (08 Sep 2022 16:13)  T(F): 98.2 (08 Sep 2022 16:13), Max: 98.2 (08 Sep 2022 16:13)  HR: 89 (08 Sep 2022 16:13) (89 - 89)  BP: 126/79 (08 Sep 2022 16:13) (126/79 - 126/79)  BP(mean): --  RR: 18 (08 Sep 2022 16:13) (18 - 18)  SpO2: 100% (08 Sep 2022 16:13) (100% - 100%)  Patient On (Oxygen Delivery Method): room air    Labs and imaging studies noted.  ekg nsr, TROP 129>128  CTA > clear lungs, no PE    Assessment and plan: 31 yo F with PMH of PCOS, irregular periods s/p recent hysteroscopy and D&C and Mirena IUD placement on Sep 1 this month, L4-S1 degenerative disc disease, and appendectomy, p/w c/o shortness of breath and non radiating midsternal chest pressure, noted to have elevated troponin admitted to r/o ACS    Atypical chest pain a/w sob  PCOS> s/p recent hysteroscopy and D&C and Mirena IUD placement  Lumbar DJD  Hepatic steatosis    - symptoms started post recent procedure D&C and Mirena IUD placement.  - reports decreased exercise tolerance since then, reports increased HR at home on her fitbit.  - CTA > clear lungs, no PE  - noted trop 129> 128, EKG NSR  - trend cardiac enzymes  - follow lipid panel, A1c. Patient w/ obesity, PCOS ( ?hyperestrogenic state). now with Mirena IUD.  - denies family hx, tobacco use. other differential pericarditis.  - Cardiology consult Dr. Montalvo  - follow ECHO  - s/c Lovenox od.

## 2022-09-08 NOTE — H&P ADULT - PROBLEM SELECTOR PLAN 4
- Incidentally found on CT  - Outpatient follow up - Incidentally found on CT  - RUQ tenderness, but has no transaminitis or bilirubinemia  - Outpatient follow up

## 2022-09-08 NOTE — ED PROVIDER NOTE - CARE PLAN
1 Principal Discharge DX:	SOB (shortness of breath)   Principal Discharge DX:	SOB (shortness of breath)  Secondary Diagnosis:	Elevated troponin level

## 2022-09-08 NOTE — ED ADULT NURSE NOTE - CHIEF COMPLAINT QUOTE
difficulty breathing since 9/1 , as per pt she has been having intermittent chest pain and difficulty breathing since her d & c 09/ 01, as per pneumologist he wants a chest xray , abg , and cta

## 2022-09-08 NOTE — ED PROVIDER NOTE - PHYSICAL EXAMINATION
Afebrile, hemodynamically stable, saturating well on RA  NAD, well appearing, anxious appearing, no WOB, speaking full sentences  Head NCAT  EOMI grossly, anicteric  MMM  No JVD  RRR, nml S1/S2, no m/r/g  Lungs CTAB, no w/r/r  Abd soft, NT, ND, nml BS, no rebound or guarding  AAO, CN's 3-12 grossly intact  ORTA spontaneously, no leg cyanosis or edema  Skin warm, well perfused, no rashes or hives

## 2022-09-08 NOTE — H&P ADULT - NSHPREVIEWOFSYSTEMS_GEN_ALL_CORE
CONSTITUTIONAL: No fever, weight loss, or fatigue  EYES: No eye pain, visual disturbances, or discharge  ENT:  No difficulty hearing, tinnitus, vertigo; No sinus or throat pain  NECK: No pain or stiffness  RESPIRATORY: No cough, wheezing, chills or hemoptysis; (+)Shortness of Breath  CARDIOVASCULAR: No passing out, dizziness, or leg swelling. (+)Chest pain (+)palpitation  GASTROINTESTINAL: No abdominal or epigastric pain. No nausea, vomiting, or hematemesis; No diarrhea or constipation. No melena or hematochezia.  GENITOURINARY: No dysuria, frequency, hematuria, or incontinence  NEUROLOGICAL: No memory loss, loss of strength, numbness, or tremors. (+)headache  SKIN: No itching, burning, rashes, or lesions   LYMPH Nodes: No enlarged glands  ENDOCRINE: No heat or cold intolerance; No hair loss  MUSCULOSKELETAL: No joint pain or swelling; No muscle, back, No extremity pain  PSYCHIATRIC: No depression, mood swings, or difficulty sleeping. (+)anxiety  HEME/LYMPH: No easy bruising, or bleeding gums  ALLERGY AND IMMUNOLOGIC: No hives or eczema

## 2022-09-08 NOTE — ED PROVIDER NOTE - CLINICAL SUMMARY MEDICAL DECISION MAKING FREE TEXT BOX
No wheezing and good air mvmt with low suspicion for acute process. No e/o fluid overload, cardiomyopathy or myocarditis. Character lower suspicion for PE and negative D-dimer during recent ED visit however pt worsening symptoms and would like to pursue CTA today. No wheezing and good air mvmt with low suspicion for acute process. No e/o fluid overload, cardiomyopathy or myocarditis. Character lower suspicion for PE and negative D-dimer during recent ED visit however pt worsening symptoms and would like to pursue CTA today - this was negative. Character low suspicion for ACS and ECG unremarkable, however trop positive and will admit for this. No e/o PNA, PTX, or fluid overload on exam or CXR. No arrhythmia. No anemia. Patient well appearing, hemodynamically stable, significantly improved, no WOB. Admitted to internal medicine for further monitoring, w/u, and care.

## 2022-09-08 NOTE — ED PROVIDER NOTE - OBJECTIVE STATEMENT
32yoF with h/o PCOS, s/p hysteroscopy and D&C and Mirena IUD placement on 9/1, presents with SOB and nonradiating midsternal chest pressure since the procedure, feels like is worsening though states this may be 2/2 anxiety. States was on OCP's years ago and felt a lot of anxiety after that and unsure if worsening 2/2 her anxiety. Went to cardiologist Dr. Rebolledo today who referred to pulmonologist Dr. Jiang who saw pt and per triage note who d/w pulm requested CTA, pt also states having some mucus today and given some albuterol which felt like it caused more mucus to come up. Minimal vaginal spotting from IUD. Denies fever, recent long distance travel, leg pain or swelling, abd pain, v/d, and all other symptoms.

## 2022-09-08 NOTE — H&P ADULT - NSHPPHYSICALEXAM_GEN_ALL_CORE
GENERAL: NAD, well-groomed, well-developed  HEAD:  Atraumatic, Normocephalic  EYES: EOMI, PERRLA, conjunctiva and sclera clear  ENMT: No tonsillar erythema, exudates, or enlargement; Moist mucous membranes, Good dentition, No lesions  NECK: Supple, normal appearance, No JVD; Normal thyroid; Trachea midline  NERVOUS SYSTEM: Alert & Oriented X3, Motor Strength 5/5 B/L upper and lower extremities, sensation intact  CHEST/LUNG: Lungs clear to auscultation bilaterally, No rales, rhonchi, wheezing   HEART: Regular rate and rhythm; No murmurs, rubs, or gallops  ABDOMEN: Soft, Nontender, Nondistended; Bowel sounds present  EXTREMITIES:  2+ Peripheral Pulses, No clubbing, cyanosis, or edema  LYMPH: No lymphadenopathy noted  SKIN: No rashes or lesions;  Good capillary refill GENERAL: NAD, well-groomed, well-developed  HEAD:  Atraumatic, Normocephalic  EYES: EOMI, PERRLA, conjunctiva and sclera clear  ENMT: No tonsillar erythema, exudates, or enlargement; Moist mucous membranes, Good dentition, No lesions  NECK: Supple, normal appearance, No JVD; Normal thyroid; Trachea midline  NERVOUS SYSTEM: Alert & Oriented X3, Motor Strength 5/5 B/L upper and lower extremities, sensation intact  CHEST/LUNG: Lungs clear to auscultation bilaterally, No rales, rhonchi, wheezing   HEART: Regular rate and rhythm; No murmurs, rubs, or gallops  ABDOMEN: Soft, RUQ tenderness on deep palpation, Nondistended; Bowel sounds present  EXTREMITIES:  2+ Peripheral Pulses, No clubbing, cyanosis, or edema  LYMPH: No lymphadenopathy noted  SKIN: No rashes or lesions;  Good capillary refill

## 2022-09-08 NOTE — H&P ADULT - PROBLEM SELECTOR PLAN 1
- P/w SOB, acute onset of difficulty with deep inspiration, with chest pressure, exertional dyspnea  - Low suspicion for ACS given presentation  - Trop 129 -> trended down, likely demand ischemia  - TSH wnl  - ProBNP negative  - EKG = NSR  - Admit to telemetry  - F/u echo  - Cardio Dr. Montalvo consulted - P/w SOB, acute onset of difficulty with deep inspiration, with chest pressure, exertional dyspnea  - Trop 129 -> trended down  - TSH wnl  - ProBNP negative  - EKG = NSR  - Admit to telemetry  - F/u echo  - Cardio Dr. Montalvo consulted - P/w SOB, acute onset of difficulty with deep inspiration, with chest pressure, exertional dyspnea  - Trop 129 -> trended down  - TSH wnl  - ProBNP negative  - EKG = NSR  - Admit to telemetry  - F/u A1c, lipid panel given obesity, PCOS  - F/u echo  - Cardio Dr. Montalvo consulted

## 2022-09-08 NOTE — ED ADULT NURSE NOTE - OBJECTIVE STATEMENT
Pt AOx4, ambulatory, c/o intermittent chest pain and difficulty breathing since 9/1. Pt denies dizziness, n/v/f. EKG done, pt placed on cardiac monitor, no signs of distress noted.

## 2022-09-09 ENCOUNTER — TRANSCRIPTION ENCOUNTER (OUTPATIENT)
Age: 32
End: 2022-09-09

## 2022-09-09 VITALS
OXYGEN SATURATION: 98 % | TEMPERATURE: 98 F | SYSTOLIC BLOOD PRESSURE: 119 MMHG | DIASTOLIC BLOOD PRESSURE: 73 MMHG | RESPIRATION RATE: 18 BRPM | HEART RATE: 79 BPM

## 2022-09-09 DIAGNOSIS — E28.2 POLYCYSTIC OVARIAN SYNDROME: ICD-10-CM

## 2022-09-09 DIAGNOSIS — Z29.9 ENCOUNTER FOR PROPHYLACTIC MEASURES, UNSPECIFIED: ICD-10-CM

## 2022-09-09 DIAGNOSIS — R06.02 SHORTNESS OF BREATH: ICD-10-CM

## 2022-09-09 DIAGNOSIS — K76.0 FATTY (CHANGE OF) LIVER, NOT ELSEWHERE CLASSIFIED: ICD-10-CM

## 2022-09-09 DIAGNOSIS — R07.89 OTHER CHEST PAIN: ICD-10-CM

## 2022-09-09 DIAGNOSIS — R77.8 OTHER SPECIFIED ABNORMALITIES OF PLASMA PROTEINS: ICD-10-CM

## 2022-09-09 LAB
A1C WITH ESTIMATED AVERAGE GLUCOSE RESULT: 5.3 % — SIGNIFICANT CHANGE UP (ref 4–5.6)
ANION GAP SERPL CALC-SCNC: 9 MMOL/L — SIGNIFICANT CHANGE UP (ref 5–17)
B PERT DNA SPEC QL NAA+PROBE: SIGNIFICANT CHANGE UP
BUN SERPL-MCNC: 17 MG/DL — SIGNIFICANT CHANGE UP (ref 7–18)
C PNEUM DNA SPEC QL NAA+PROBE: SIGNIFICANT CHANGE UP
CALCIUM SERPL-MCNC: 8.7 MG/DL — SIGNIFICANT CHANGE UP (ref 8.4–10.5)
CHLORIDE SERPL-SCNC: 109 MMOL/L — HIGH (ref 96–108)
CHOLEST SERPL-MCNC: 106 MG/DL — SIGNIFICANT CHANGE UP
CO2 SERPL-SCNC: 23 MMOL/L — SIGNIFICANT CHANGE UP (ref 22–31)
CREAT SERPL-MCNC: 0.94 MG/DL — SIGNIFICANT CHANGE UP (ref 0.5–1.3)
EGFR: 82 ML/MIN/1.73M2 — SIGNIFICANT CHANGE UP
ERYTHROCYTE [SEDIMENTATION RATE] IN BLOOD: 3 MM/HR — SIGNIFICANT CHANGE UP (ref 0–15)
ESTIMATED AVERAGE GLUCOSE: 105 MG/DL — SIGNIFICANT CHANGE UP (ref 68–114)
FLUAV H1 2009 PAND RNA SPEC QL NAA+PROBE: SIGNIFICANT CHANGE UP
FLUAV H1 RNA SPEC QL NAA+PROBE: SIGNIFICANT CHANGE UP
FLUAV H3 RNA SPEC QL NAA+PROBE: SIGNIFICANT CHANGE UP
FLUAV SUBTYP SPEC NAA+PROBE: SIGNIFICANT CHANGE UP
FLUBV RNA SPEC QL NAA+PROBE: SIGNIFICANT CHANGE UP
GLUCOSE SERPL-MCNC: 108 MG/DL — HIGH (ref 70–99)
HADV DNA SPEC QL NAA+PROBE: SIGNIFICANT CHANGE UP
HCOV PNL SPEC NAA+PROBE: SIGNIFICANT CHANGE UP
HCT VFR BLD CALC: 34.1 % — LOW (ref 34.5–45)
HDLC SERPL-MCNC: 29 MG/DL — LOW
HGB BLD-MCNC: 11.4 G/DL — LOW (ref 11.5–15.5)
HMPV RNA SPEC QL NAA+PROBE: SIGNIFICANT CHANGE UP
HPIV1 RNA SPEC QL NAA+PROBE: SIGNIFICANT CHANGE UP
HPIV2 RNA SPEC QL NAA+PROBE: SIGNIFICANT CHANGE UP
HPIV3 RNA SPEC QL NAA+PROBE: SIGNIFICANT CHANGE UP
HPIV4 RNA SPEC QL NAA+PROBE: SIGNIFICANT CHANGE UP
LIPID PNL WITH DIRECT LDL SERPL: SIGNIFICANT CHANGE UP MG/DL
MAGNESIUM SERPL-MCNC: 2.2 MG/DL — SIGNIFICANT CHANGE UP (ref 1.6–2.6)
MCHC RBC-ENTMCNC: 30.3 PG — SIGNIFICANT CHANGE UP (ref 27–34)
MCHC RBC-ENTMCNC: 33.4 GM/DL — SIGNIFICANT CHANGE UP (ref 32–36)
MCV RBC AUTO: 90.7 FL — SIGNIFICANT CHANGE UP (ref 80–100)
NON HDL CHOLESTEROL: 77 MG/DL — SIGNIFICANT CHANGE UP
NRBC # BLD: 0 /100 WBCS — SIGNIFICANT CHANGE UP (ref 0–0)
PCP SPEC-MCNC: SIGNIFICANT CHANGE UP
PLATELET # BLD AUTO: 258 K/UL — SIGNIFICANT CHANGE UP (ref 150–400)
POTASSIUM SERPL-MCNC: 4.1 MMOL/L — SIGNIFICANT CHANGE UP (ref 3.5–5.3)
POTASSIUM SERPL-SCNC: 4.1 MMOL/L — SIGNIFICANT CHANGE UP (ref 3.5–5.3)
RAPID RVP RESULT: SIGNIFICANT CHANGE UP
RBC # BLD: 3.76 M/UL — LOW (ref 3.8–5.2)
RBC # FLD: 12.9 % — SIGNIFICANT CHANGE UP (ref 10.3–14.5)
RSV RNA SPEC QL NAA+PROBE: SIGNIFICANT CHANGE UP
RV+EV RNA SPEC QL NAA+PROBE: SIGNIFICANT CHANGE UP
SARS-COV-2 RNA SPEC QL NAA+PROBE: SIGNIFICANT CHANGE UP
SARS-COV-2 RNA SPEC QL NAA+PROBE: SIGNIFICANT CHANGE UP
SODIUM SERPL-SCNC: 141 MMOL/L — SIGNIFICANT CHANGE UP (ref 135–145)
TRIGL SERPL-MCNC: 405 MG/DL — HIGH
TROPONIN I, HIGH SENSITIVITY RESULT: 121.2 NG/L — HIGH
WBC # BLD: 7.51 K/UL — SIGNIFICANT CHANGE UP (ref 3.8–10.5)
WBC # FLD AUTO: 7.51 K/UL — SIGNIFICANT CHANGE UP (ref 3.8–10.5)

## 2022-09-09 PROCEDURE — 84443 ASSAY THYROID STIM HORMONE: CPT

## 2022-09-09 PROCEDURE — 85025 COMPLETE CBC W/AUTO DIFF WBC: CPT

## 2022-09-09 PROCEDURE — 84702 CHORIONIC GONADOTROPIN TEST: CPT

## 2022-09-09 PROCEDURE — 83735 ASSAY OF MAGNESIUM: CPT

## 2022-09-09 PROCEDURE — 93005 ELECTROCARDIOGRAM TRACING: CPT

## 2022-09-09 PROCEDURE — 36415 COLL VENOUS BLD VENIPUNCTURE: CPT

## 2022-09-09 PROCEDURE — 71275 CT ANGIOGRAPHY CHEST: CPT | Mod: MA

## 2022-09-09 PROCEDURE — 87635 SARS-COV-2 COVID-19 AMP PRB: CPT

## 2022-09-09 PROCEDURE — 85652 RBC SED RATE AUTOMATED: CPT

## 2022-09-09 PROCEDURE — 0225U NFCT DS DNA&RNA 21 SARSCOV2: CPT

## 2022-09-09 PROCEDURE — 86703 HIV-1/HIV-2 1 RESULT ANTBDY: CPT

## 2022-09-09 PROCEDURE — 99239 HOSP IP/OBS DSCHRG MGMT >30: CPT

## 2022-09-09 PROCEDURE — 84484 ASSAY OF TROPONIN QUANT: CPT

## 2022-09-09 PROCEDURE — 80061 LIPID PANEL: CPT

## 2022-09-09 PROCEDURE — 93306 TTE W/DOPPLER COMPLETE: CPT

## 2022-09-09 PROCEDURE — 85027 COMPLETE CBC AUTOMATED: CPT

## 2022-09-09 PROCEDURE — 83880 ASSAY OF NATRIURETIC PEPTIDE: CPT

## 2022-09-09 PROCEDURE — 99285 EMERGENCY DEPT VISIT HI MDM: CPT | Mod: 25

## 2022-09-09 PROCEDURE — 83036 HEMOGLOBIN GLYCOSYLATED A1C: CPT

## 2022-09-09 PROCEDURE — 71046 X-RAY EXAM CHEST 2 VIEWS: CPT

## 2022-09-09 PROCEDURE — 80307 DRUG TEST PRSMV CHEM ANLYZR: CPT

## 2022-09-09 PROCEDURE — 80053 COMPREHEN METABOLIC PANEL: CPT

## 2022-09-09 PROCEDURE — 80048 BASIC METABOLIC PNL TOTAL CA: CPT

## 2022-09-09 RX ORDER — ONDANSETRON 8 MG/1
4 TABLET, FILM COATED ORAL EVERY 8 HOURS
Refills: 0 | Status: DISCONTINUED | OUTPATIENT
Start: 2022-09-09 | End: 2022-09-09

## 2022-09-09 RX ORDER — METFORMIN HYDROCHLORIDE 850 MG/1
1 TABLET ORAL
Qty: 0 | Refills: 0 | DISCHARGE

## 2022-09-09 RX ORDER — ACETAMINOPHEN 500 MG
650 TABLET ORAL EVERY 6 HOURS
Refills: 0 | Status: DISCONTINUED | OUTPATIENT
Start: 2022-09-09 | End: 2022-09-09

## 2022-09-09 RX ORDER — ENOXAPARIN SODIUM 100 MG/ML
40 INJECTION SUBCUTANEOUS EVERY 24 HOURS
Refills: 0 | Status: DISCONTINUED | OUTPATIENT
Start: 2022-09-09 | End: 2022-09-09

## 2022-09-09 RX ORDER — LANOLIN ALCOHOL/MO/W.PET/CERES
3 CREAM (GRAM) TOPICAL AT BEDTIME
Refills: 0 | Status: DISCONTINUED | OUTPATIENT
Start: 2022-09-09 | End: 2022-09-09

## 2022-09-09 RX ORDER — IBUPROFEN 200 MG
1 TABLET ORAL
Qty: 0 | Refills: 0 | DISCHARGE
End: 2022-09-06

## 2022-09-09 RX ORDER — ACETAMINOPHEN 500 MG
2 TABLET ORAL
Qty: 0 | Refills: 0 | DISCHARGE
End: 2022-09-06

## 2022-09-09 RX ADMIN — Medication 600 MILLIGRAM(S): at 02:08

## 2022-09-09 RX ADMIN — ENOXAPARIN SODIUM 40 MILLIGRAM(S): 100 INJECTION SUBCUTANEOUS at 06:20

## 2022-09-09 NOTE — DISCHARGE NOTE PROVIDER - HOSPITAL COURSE
Patient is a 32F, with PMHx of PCOS, irregular periods s/p hysteroscopy and D&C and Mirena IUD placement on 9/1, L4-S1 degenerative disc disease, and appendectomy, who comes in with shortness of breath and nonradiating midsternal chest pressure since her procedure on 9/1. She was under general anesthesia with intubation and after the procedure, she has been having difficulty with deep inspiration and has sharp chest pain when she tries hard to breathe in deeply. She has been getting chest pressure and SOB with minimal exertion. She used to be able to cycle 30 minutes but now she can only do 1 minute before getting SOB.  She endorses chronic anxiety and 1 week of intermittent bilateral headache that comes and goes and is relieved by rest.  She also says that she has been having some mucus today and it is difficult to bring it up. She denies any nausea, vomiting, abdominal pain, joint pain, dysuria, hematuria, leg swelling, diarrhea, constipation, and dark/bloody stool.    Atypical chest pain.   ·  Plan: - P/w SOB, acute onset of difficulty with deep inspiration, with chest pressure, exertional dyspnea  - Trop 129 -> trended down  - TSH wnl  - ProBNP negative  - EKG = NSR  - Admit to telemetry  - F/u A1c, lipid panel given obesity, PCOS  - F/u echo  - Cardio Dr. Montalvo consulted.     Problem/Plan - 2:  ·  Problem: Shortness of breath.   ·  Plan: - P/w SOB, acute onset of difficulty with deep inspiration, with chest pressure, exertional dyspnea(exercise intolerance) that started after intubation for procedure  - CTA chest = negative for PE. Clear lungs  - COVID negative  - RVP negative 6 days ago  - Mucinex for mucus  - Saturating 100% on room air.       Problem/Plan - 3:  ·  Problem: Polycystic ovarian syndrome.   ·  Plan: - Home med Metformin 500mg daily.     Problem/Plan - 4:  ·  Problem: Hepatic steatosis.   ·  Plan: - Incidentally found on CT  - RUQ tenderness, but has no transaminitis or bilirubinemia  - Outpatient follow up.     Problem/Plan - 5:  ·  Problem: Prophylactic measure.   ·  Plan: - Lovenox. Patient is a 32F, with PMHx of PCOS, irregular periods s/p hysteroscopy and D&C and Mirena IUD placement on 9/1, L4-S1 degenerative disc disease, and appendectomy, who comes in with shortness of breath and nonradiating midsternal chest pressure since her procedure on 9/1. She was under general anesthesia with intubation and after the procedure, she has been having difficulty with deep inspiration and has sharp chest pain when she tries hard to breathe in deeply. She has been getting chest pressure and SOB with minimal exertion. She used to be able to cycle 30 minutes but now she can only do 1 minute before getting SOB.  She endorses chronic anxiety and 1 week of intermittent bilateral headache that comes and goes and is relieved by rest.  She also says that she has been having some mucus today and it is difficult to bring it up. She denies any nausea, vomiting, abdominal pain, joint pain, dysuria, hematuria, leg swelling, diarrhea, constipation, and dark/bloody stool.    Atypical chest pain.   ·  Plan: - P/w SOB, acute onset of difficulty with deep inspiration, with chest pressure, exertional dyspnea  - Trop 129 -> trended down  - TSH wnl  - ProBNP negative  - EKG = NSR  - Admit to telemetry  - F/u A1c, lipid panel given obesity, PCOS  - F/u echo  - Cardio Dr. Montalvo consulted.     Problem/Plan - 2:  ·  Problem: Shortness of breath.   ·  Plan: - P/w SOB, acute onset of difficulty with deep inspiration, with chest pressure, exertional dyspnea(exercise intolerance) that started after intubation for procedure  - CTA chest = negative for PE. Clear lungs  - COVID negative  - RVP negative 6 days ago  - Mucinex for mucus  - Saturating 100% on room air.       Problem/Plan - 3:  ·  Problem: Polycystic ovarian syndrome.   ·  Plan: - Home med Metformin 500mg daily.     Problem/Plan - 4:  ·  Problem: Hepatic steatosis.   ·  Plan: - Incidentally found on CT  - RUQ tenderness, but has no transaminitis or bilirubinemia  - Outpatient follow up.     Problem/Plan - 5:  ·  Problem: Prophylactic measure.   ·  Plan: - Lovenox.      Patient is stable for discharge and is advised to follow up with PCP as outpatient.  Please refer to patient's complete medical chart with documents for a full hospital course, for this is only a brief summary. Patient is a 32F, with PMHx of PCOS, irregular periods s/p hysteroscopy and D&C and Mirena IUD placement on 9/1, L4-S1 degenerative disc disease, and appendectomy, who comes in with shortness of breath and nonradiating midsternal chest pressure since her procedure on 9/1. She was under general anesthesia with intubation and after the procedure, she has been having difficulty with deep inspiration and has sharp chest pain when she tries hard to breathe in deeply. She has been getting chest pressure and SOB with minimal exertion. She used to be able to cycle 30 minutes but now she can only do 1 minute before getting SOB.  She endorses chronic anxiety and 1 week of intermittent bilateral headache that comes and goes and is relieved by rest. Admitted for SOB and atypical chest pain, Cardio Dr. Montalvo consulted. CTA chest negative for PE and clear lungs, EKG shows NSR, and troponin trending down, proBNP WNL, and saturating well on room air. Patient symptoms improved.      Patient is stable for discharge and is advised to follow up with PCP as outpatient. Discharge discussed with attending.  Please refer to patient's complete medical chart with documents for a full hospital course, for this is only a brief summary.

## 2022-09-09 NOTE — DISCHARGE NOTE PROVIDER - NSFOLLOWUPCLINICS_GEN_ALL_ED_FT
[facility]  Endocrinology  [address] [place] [address]  Phone: [phone]  Fax: [phone]  Follow Up Time: 1 week

## 2022-09-09 NOTE — DISCHARGE NOTE NURSING/CASE MANAGEMENT/SOCIAL WORK - PATIENT PORTAL LINK FT
You can access the FollowMyHealth Patient Portal offered by [facility] by registering at the following website: [email or web address]. By joining [email or web address]’s FollowMyHealth portal, you will also be able to view your health information using other applications (apps) compatible with our system.

## 2022-09-09 NOTE — PATIENT PROFILE ADULT - FUNCTIONAL ASSESSMENT - BASIC MOBILITY 6.
4-calculated by average/Not able to assess (calculate score using [facility] averaging method)

## 2022-09-09 NOTE — DISCHARGE NOTE PROVIDER - CARE PROVIDER_API CALL
Keyur Montalvo)  Cardiology  [address] [place] [address]  Phone: [phone]  Fax: [phone]  Follow Up Time: 1 week

## 2022-09-09 NOTE — PATIENT PROFILE ADULT - FUNCTIONAL ASSESSMENT - DAILY ACTIVITY SECTION LABEL
Discharge Instructions


Admission


Reason for Admission:  Lumbar Spinal Stenosis





Discharge


Discharge Diagnosis / Problem:  stenosis





Discharge Goals


Goal(s):  Improve function





Activity Recommendations


Activity Limitations:  per Instructions/Follow-up section





.





Instructions / Follow-Up


Instructions / Follow-Up


u


ACTIVITY RECOMMENDATIONS:





SELF CARE INSTRUCTIONS AFTER THORACIC/LUMBAR FUSIONS





1.  You may walk to your tolerance.  It is good exercise for your legs and back.


      Expect some back and intermittent leg aches and pains.





2.  You may perform "counter-top" level activities (make a sandwich, airam 

with a


     project, etc.).





3.  No bending or lifting of more than 10 pounds or back twisting of any nature 

(roll


      like a log when turning in bed).





4.  You may ride in a car for 20-30 minutes at a time.  No driving until after 

your


      first visit with your doctor.





5.  Frequent changes of position and restricting sitting to 30 minutes at a 

time will


      help limit the amount of back spasms and stiffness you may experience.





6.  You may discontinue the use of ambulatory aids (cane, crutches, etc.) once 

your


      strength and confidence allow.





7.  You may  the shower and let water strike your incision when you 

arrive 


     home at least once daily.  Do not take a tub bath, sit in a hot tub or go 

into a


     swimming pool until after your first recheck in the office.








SPECIAL CARE INSTRUCTIONS:





**VERY IMPORTANT TO READ AND REVIEW**





A.  Your surgical incision has been closed with a cosmetic suture under the 


     skin that will dissolve in about 6 weeks.  In 14 days, you can use a pair 


     of clean scissors and cut the suture that is left outside of the skin at 

the 


     ends of your incision.


   1.  The small skin tapes can be removed 7 days after surgery if they 


               have not fallen off by that point.


   2.  You may keep the wound open to air as much as possible to promote


              healing after post-op day number 5 unless told otherwise by your 

doctor.


   3.  If you think the wound looks like it is becoming infected (redness or 


              worsening drainage) and/or you are experiencing fever, chill or 

worsening


              back pain and muscle spasms, contact the office so that we may 

evaluate


              you as soon as possible.





B.  Complications are uncommon, but please contact us if you have any signs or 


     symptoms of:


   1.  wound infection (fever higher than 102.5 degrees F, redness, separation


              of wound, drainage, or increasing pain from the incision) 


   2.  blood clots in legs (pain, swelling, redness and warmth in legs)


   3.  urinary tract infection (fever higher than 102.5 degrees F, burning upon


              urination or increased frequency of urination)


   4.  nerve problems (inability to walk on your toes or heels, numbness, loss 


              of bowel or bladder control)


   5.  any other symptoms that concern you





C.  Please call the office at [phone] if you have any concerns or 

questions


     about your operation or recovery.





D.  No smoking!  Smoking drastically decreases the chance of a solid fusion.





E.  Do not take any anti-inflammatory medications (Indocin, Advil, Motrin, 

Aspirin, 


     Naprosyn, etc.) as these may inhibit the chance of a solid fusion.  

Tylenol is


     okay to take for pain.








MANAGING PAIN AFTER SPINAL SURGERY





1.  Narcotic medication is intended for short-term use and will be provided for 


     surgical pain.  Surgical pain usually lasts for a period of 4-6 weeks.  

Narcotic 


     medication includes Percocet, Vicodin, Darvocet, Tylenol #3 or Lortab.





2.  Longer-term pain is more appropriately treated with non-narcotic medication 


    such as Tylenol ES.





3.  Muscle spasm is not appropriately treated with narcotics.  Muscle relaxers 

such


    as Soma, Flexeril or Skelaxin can be used along with Tylenol ES.





4.  Remember that we all live with some "aches and pains".  This is not unusual 

or 


     uncommon after an injury or as we get older.


   a.  Back pain is expected and may include muscle spasms for 4 to 6 weeks 


              after surgery.  The pain should gradually improve.  If the pain 

worsens for 


              no apparent reason, please contact the office.


   b.  Intermittent leg pain may also be experienced and should not be concerned


        about unless it worsens for no apparent reason.  If so, please contact 

the


               office.





5.  We will provide appropriate medication within the normal guidelines of 

their prescribed


     use.  We will also be very cautious and aware of potential abuse and 

extended


     duration of patients' medication needs.


   a.  Pain medications are for your comfort and to assist with sleep and


        rest so that the tissue can heal.  They are not provided in order to 

return 


              to normal activity and should not be used through the day.  To do 

so or 


              worsening pain at night can result from ongoing tissue damage and 

development


              of tolerance to the prescribed medicine.





6.  Please allow 2-3 days to process refills.  Prescriptions will not be mailed 

but must be


     picked up at the office.








FOLLOW UP VISIT:





Keep your scheduled follow-up appointment. 





Any questions, please call the office at [phone].





Current Hospital Diet


Patient's current hospital diet: Diabetes Type 2 Diet





Discharge Diet


Recommended Diet:  Regular Diet





Procedures


Procedures Performed:  


L2-S1 Decompression; Posterior Spinal Fusion; Instumentation; Brigida Allograft

, 


Interbody Fusion with Application of Interbody Cage at L4-L5 and Bone 


Morphogenetic Protein with Iliac Bolts





Pending Studies


Studies pending at discharge:  no





Laboratory Results





 Hemoglobin A1c








Test


  1/30/17


14:01 Range/Units


 


 


Estimated Average Glucose 131   mg/dl


 


Hemoglobin A1c 6.2 H 4.5-5.6  %











Medical Emergencies








.


Who to Call and When:





Medical Emergencies:  If at any time you feel your situation is an emergency, 

please call 911 immediately.





.





Non-Emergent Contact


Non-Emergency issues call your:  Primary Care Provider


.





"Provider Documentation" section prepared by Viktor Blunt.





VTE Core Measure


Inpt VTE Proph given/why not?:  T.E.D. Stockings, SCD's .

## 2022-09-09 NOTE — DISCHARGE NOTE NURSING/CASE MANAGEMENT/SOCIAL WORK - NSDCPEFALRISK_GEN_ALL_CORE
For information on Fall & Injury Prevention, visit: [email or web address] OR  [email or web address] OR  https://www.cdc.gov/steadi/patient.html

## 2022-09-09 NOTE — DISCHARGE NOTE PROVIDER - NSDCCPCAREPLAN_GEN_ALL_CORE_FT
PRINCIPAL DISCHARGE DIAGNOSIS  Diagnosis: Elevated troponin level  Assessment and Plan of Treatment:       SECONDARY DISCHARGE DIAGNOSES  Diagnosis: Atypical chest pain  Assessment and Plan of Treatment:     Diagnosis: Shortness of breath  Assessment and Plan of Treatment:      PRINCIPAL DISCHARGE DIAGNOSIS  Diagnosis: Elevated troponin level  Assessment and Plan of Treatment: You came to the hospital complaining of chest pain.   Your EKG showed normal sinus rhythm. You were admitted to telemetry unit for close monitoring. Your serial cardiac enzymes were slightly elevated but no concern for heart attack. No arrhythmias were seen on Tele monitor. Your Echo showed normal pumping function of the heart and no significant valvular pathology.   Your chest pain is NOT cardiac in origin. Please follow up as outpatient in a week from discharge for further recommendations.          SECONDARY DISCHARGE DIAGNOSES  Diagnosis: Polycystic ovarian syndrome  Assessment and Plan of Treatment: You have history of PCOS. You did not require any acute interventions at this time. Please continue taking your HOME medications and follow up with your PCP and OB GYN for further management.

## 2022-11-10 NOTE — ED PROVIDER NOTE - PRO INTERPRETER NEED 2
English Acitretin Pregnancy And Lactation Text: This medication is Pregnancy Category X and should not be given to women who are pregnant or may become pregnant in the future. This medication is excreted in breast milk.

## 2023-12-09 VITALS
BODY MASS INDEX: 40.22 KG/M2 | DIASTOLIC BLOOD PRESSURE: 78 MMHG | WEIGHT: 227 LBS | SYSTOLIC BLOOD PRESSURE: 113 MMHG | HEART RATE: 68 BPM | HEIGHT: 63 IN

## 2024-03-07 ENCOUNTER — NON-APPOINTMENT (OUTPATIENT)
Age: 34
End: 2024-03-07

## 2024-03-08 ENCOUNTER — NON-APPOINTMENT (OUTPATIENT)
Age: 34
End: 2024-03-08

## 2024-03-08 DIAGNOSIS — Z82.49 FAMILY HISTORY OF ISCHEMIC HEART DISEASE AND OTHER DISEASES OF THE CIRCULATORY SYSTEM: ICD-10-CM

## 2024-03-08 DIAGNOSIS — Z78.9 OTHER SPECIFIED HEALTH STATUS: ICD-10-CM

## 2024-03-08 DIAGNOSIS — N93.9 ABNORMAL UTERINE AND VAGINAL BLEEDING, UNSPECIFIED: ICD-10-CM

## 2024-03-08 DIAGNOSIS — Z87.42 PERSONAL HISTORY OF OTHER DISEASES OF THE FEMALE GENITAL TRACT: ICD-10-CM

## 2024-03-08 DIAGNOSIS — Z80.9 FAMILY HISTORY OF MALIGNANT NEOPLASM, UNSPECIFIED: ICD-10-CM

## 2024-03-08 DIAGNOSIS — Z83.3 FAMILY HISTORY OF DIABETES MELLITUS: ICD-10-CM

## 2024-03-08 DIAGNOSIS — Z12.72 ENCOUNTER FOR SCREENING FOR MALIGNANT NEOPLASM OF VAGINA: ICD-10-CM

## 2024-03-08 RX ORDER — ESCITALOPRAM OXALATE 10 MG/1
10 TABLET ORAL DAILY
Refills: 0 | Status: ACTIVE | COMMUNITY

## 2024-03-08 RX ORDER — METFORMIN HYDROCHLORIDE 500 MG/1
500 TABLET, COATED ORAL DAILY
Refills: 0 | Status: ACTIVE | COMMUNITY

## 2024-03-13 NOTE — ED ADULT TRIAGE NOTE - MODE OF ARRIVAL
Nerve Block    Performed by: Arnav Toledo MD  Authorized by: Arnav Toledo MD    Block Type: transverse abdominis plane  Laterality:  Left  Indication: procedure for pain    Preanesthetic Checklist Patient Identified (2 criteria), Block Plan Confirmed, Resuscitation Equipment Available, Supplemental O2 (if needed), Allergies Confirmed, Block Site Marked (if applicable), Monitors Applied, Aseptic Technique, Coagulation Status, Necessary Block Equipment Present, Timeout Performed, IV Access Functioning, Consent Verified, Drugs/Solutions Labeled and Sedation Given (if needed)    Prep:  Chlorhexidine gluconate (CHG)  Max Sterile Barrier Technique:  Sterile gel & probe cover, Sterile gloves, Cap/Mask, Hand Washing and Sterile drape  Monitoring:  Blood pressure, continuous capnography, continuous pulse oximetry, EKG and heart rate  Injection Technique:  Single-shot  Procedures: ultrasound guided and ultrasound permanent image saved    Needle Type:  Echogenic  Needle Gauge:  20 G  Needle Length:  9 cm  Needle Localization:  Anatomical landmarks and ultrasound guidance  Test Dose:  Negative  Physical status during block:  Sedated  Medications Administered  MIDazolam (VERSED) 1 mg/mL - Intravenous   2 mg - 3/13/2024 7:15:00 AM  bupivacaine liposome (EXPAREL) 1.3 % - Infiltration   5 mL - 3/13/2024 7:15:00 AM  bupivacaine (MARCAINE) 0.25 % - Infiltration   15 mL - 3/13/2024 7:15:00 AM  Injection Assessment:  Negative aspiration for heme, no paresthesia on injection, incremental injection and local visualized surrounding nerve on ultrasound  Patient Condition:  Tolerated well, no immediate complications  Paresthesia Pain:  Immediately resolved  Heart Rate Change: No    Slowly Injected: Yes     Needle tip visualized at all times. Pt communicative throughout       Walk in Private Auto

## 2024-06-25 NOTE — ED PROVIDER NOTE - CROS ED [facility] ALL NEG
Caller: patient daughter Jennifer    Doctor: Naomi    Reason for call: l knee brace is too big, falls off,  would like a smaller one.     Call back#: [phone]   - - -

## 2024-08-19 ENCOUNTER — APPOINTMENT (OUTPATIENT)
Dept: OBGYN | Facility: CLINIC | Age: 34
End: 2024-08-19

## 2025-08-02 NOTE — ASU PREOP CHECKLIST - VERIFY SURGICAL SITE/SIDE WITH PATIENT
Refill Routing Note   Medication(s) are not appropriate for processing by [facility] for the following reason(s):        New or recently adjusted medication    [facility] action(s):  Defer             Appointments  past 12m or future 3m with PCP    Date Provider   Last Visit   4/14/2025 Marily Junior MD   Next Visit   8/15/2025 Marily Junior MD   ED visits in past 90 days: 0        Note composed:6:44 PM 08/02/2025                   done

## (undated) DEVICE — DRAPE IRRIGATION POUCH 19X23"

## (undated) DEVICE — PRESSURE INFUSOR BAG 1000ML

## (undated) DEVICE — PREP BETADINE SPONGE STICKS

## (undated) DEVICE — MYOSURE SCOPE SEAL

## (undated) DEVICE — POSITIONER STRAP ARMBOARD VELCRO TS-30

## (undated) DEVICE — DRSG TELFA 3 X 8

## (undated) DEVICE — DRAPE TOWEL BLUE 17" X 24"

## (undated) DEVICE — PROTECTOR HEEL / ELBOW FLUFFY

## (undated) DEVICE — MARKING PEN W RULER

## (undated) DEVICE — TUBING SUCTION NONCONDUCTIVE 6MM X 12FT

## (undated) DEVICE — SOL IRR POUR H2O 500ML

## (undated) DEVICE — DRAPE LIGHT HANDLE COVER (GREEN)

## (undated) DEVICE — BASIN SET DOUBLE

## (undated) DEVICE — LABELS BLANK W PEN

## (undated) DEVICE — FLUENT FMS PROCEDURE KIT

## (undated) DEVICE — DRSG PAD SANITARY OB

## (undated) DEVICE — SOL IRR POUR NS 0.9% 1000ML

## (undated) DEVICE — TUBING IRR SET FOR CYSTOSCOPY 77"

## (undated) DEVICE — VENODYNE/SCD SLEEVE CALF MEDIUM

## (undated) DEVICE — GOWN LG

## (undated) DEVICE — VISITEC 4X4

## (undated) DEVICE — PACK PERI GYN